# Patient Record
Sex: FEMALE | Race: WHITE | NOT HISPANIC OR LATINO | Employment: PART TIME | URBAN - METROPOLITAN AREA
[De-identification: names, ages, dates, MRNs, and addresses within clinical notes are randomized per-mention and may not be internally consistent; named-entity substitution may affect disease eponyms.]

---

## 2017-11-06 ENCOUNTER — HOSPITAL ENCOUNTER (EMERGENCY)
Facility: HOSPITAL | Age: 27
Discharge: HOME | End: 2017-11-06
Attending: EMERGENCY MEDICINE | Admitting: EMERGENCY MEDICINE
Payer: COMMERCIAL

## 2017-11-06 ENCOUNTER — APPOINTMENT (EMERGENCY)
Dept: ULTRASOUND IMAGING | Facility: HOSPITAL | Age: 27
End: 2017-11-06
Payer: COMMERCIAL

## 2017-11-06 VITALS
OXYGEN SATURATION: 98 % | DIASTOLIC BLOOD PRESSURE: 55 MMHG | SYSTOLIC BLOOD PRESSURE: 101 MMHG | RESPIRATION RATE: 18 BRPM | WEIGHT: 170 LBS | BODY MASS INDEX: 31.28 KG/M2 | HEIGHT: 62 IN | HEART RATE: 76 BPM | TEMPERATURE: 98.7 F

## 2017-11-06 DIAGNOSIS — R11.0 NAUSEA: ICD-10-CM

## 2017-11-06 DIAGNOSIS — R10.9 ABDOMINAL PAIN: Primary | ICD-10-CM

## 2017-11-06 LAB
ALBUMIN SERPL BCP-MCNC: 4 G/DL (ref 3.5–5)
ALP SERPL-CCNC: 59 U/L (ref 46–116)
ALT SERPL W P-5'-P-CCNC: 16 U/L (ref 12–78)
ANION GAP SERPL CALCULATED.3IONS-SCNC: 8 MMOL/L (ref 4–13)
AST SERPL W P-5'-P-CCNC: 11 U/L (ref 5–45)
BASOPHILS # BLD AUTO: 0.05 THOUSANDS/ΜL (ref 0–0.1)
BASOPHILS NFR BLD AUTO: 1 % (ref 0–1)
BILIRUB SERPL-MCNC: 0.3 MG/DL (ref 0.2–1)
BILIRUB UR QL STRIP: NEGATIVE
BUN SERPL-MCNC: 11 MG/DL (ref 5–25)
CALCIUM SERPL-MCNC: 9.3 MG/DL (ref 8.3–10.1)
CHLORIDE SERPL-SCNC: 105 MMOL/L (ref 100–108)
CLARITY UR: NORMAL
CO2 SERPL-SCNC: 29 MMOL/L (ref 21–32)
COLOR UR: YELLOW
CREAT SERPL-MCNC: 0.63 MG/DL (ref 0.6–1.3)
EOSINOPHIL # BLD AUTO: 0.17 THOUSAND/ΜL (ref 0–0.61)
EOSINOPHIL NFR BLD AUTO: 2 % (ref 0–6)
ERYTHROCYTE [DISTWIDTH] IN BLOOD BY AUTOMATED COUNT: 14 % (ref 11.6–15.1)
EXT PREG TEST URINE: NEGATIVE
GFR SERPL CREATININE-BSD FRML MDRD: 123 ML/MIN/1.73SQ M
GLUCOSE SERPL-MCNC: 92 MG/DL (ref 65–140)
GLUCOSE UR STRIP-MCNC: NEGATIVE MG/DL
HCT VFR BLD AUTO: 40.2 % (ref 34.8–46.1)
HGB BLD-MCNC: 13.3 G/DL (ref 11.5–15.4)
HGB UR QL STRIP.AUTO: NEGATIVE
KETONES UR STRIP-MCNC: NEGATIVE MG/DL
LEUKOCYTE ESTERASE UR QL STRIP: NEGATIVE
LIPASE SERPL-CCNC: 160 U/L (ref 73–393)
LYMPHOCYTES # BLD AUTO: 2.34 THOUSANDS/ΜL (ref 0.6–4.47)
LYMPHOCYTES NFR BLD AUTO: 26 % (ref 14–44)
MCH RBC QN AUTO: 31.8 PG (ref 26.8–34.3)
MCHC RBC AUTO-ENTMCNC: 33.1 G/DL (ref 31.4–37.4)
MCV RBC AUTO: 96 FL (ref 82–98)
MONOCYTES # BLD AUTO: 0.71 THOUSAND/ΜL (ref 0.17–1.22)
MONOCYTES NFR BLD AUTO: 8 % (ref 4–12)
NEUTROPHILS # BLD AUTO: 5.77 THOUSANDS/ΜL (ref 1.85–7.62)
NEUTS SEG NFR BLD AUTO: 64 % (ref 43–75)
NITRITE UR QL STRIP: NEGATIVE
NRBC BLD AUTO-RTO: 0 /100 WBCS
PH UR STRIP.AUTO: 7 [PH] (ref 4.5–8)
PLATELET # BLD AUTO: 274 THOUSANDS/UL (ref 149–390)
PMV BLD AUTO: 10.2 FL (ref 8.9–12.7)
POTASSIUM SERPL-SCNC: 4.6 MMOL/L (ref 3.5–5.3)
PROT SERPL-MCNC: 7.5 G/DL (ref 6.4–8.2)
PROT UR STRIP-MCNC: NEGATIVE MG/DL
RBC # BLD AUTO: 4.18 MILLION/UL (ref 3.81–5.12)
SODIUM SERPL-SCNC: 142 MMOL/L (ref 136–145)
SP GR UR STRIP.AUTO: 1.02 (ref 1–1.03)
UROBILINOGEN UR QL STRIP.AUTO: 0.2 E.U./DL
WBC # BLD AUTO: 9.06 THOUSAND/UL (ref 4.31–10.16)

## 2017-11-06 PROCEDURE — 80053 COMPREHEN METABOLIC PANEL: CPT | Performed by: EMERGENCY MEDICINE

## 2017-11-06 PROCEDURE — 76705 ECHO EXAM OF ABDOMEN: CPT

## 2017-11-06 PROCEDURE — 85025 COMPLETE CBC W/AUTO DIFF WBC: CPT | Performed by: EMERGENCY MEDICINE

## 2017-11-06 PROCEDURE — 81025 URINE PREGNANCY TEST: CPT | Performed by: EMERGENCY MEDICINE

## 2017-11-06 PROCEDURE — 81003 URINALYSIS AUTO W/O SCOPE: CPT | Performed by: EMERGENCY MEDICINE

## 2017-11-06 PROCEDURE — 36415 COLL VENOUS BLD VENIPUNCTURE: CPT | Performed by: EMERGENCY MEDICINE

## 2017-11-06 PROCEDURE — 83690 ASSAY OF LIPASE: CPT | Performed by: EMERGENCY MEDICINE

## 2017-11-06 PROCEDURE — 99284 EMERGENCY DEPT VISIT MOD MDM: CPT

## 2017-11-06 PROCEDURE — 96374 THER/PROPH/DIAG INJ IV PUSH: CPT

## 2017-11-06 PROCEDURE — 96375 TX/PRO/DX INJ NEW DRUG ADDON: CPT

## 2017-11-06 PROCEDURE — 96361 HYDRATE IV INFUSION ADD-ON: CPT

## 2017-11-06 RX ORDER — KETOROLAC TROMETHAMINE 30 MG/ML
15 INJECTION, SOLUTION INTRAMUSCULAR; INTRAVENOUS ONCE
Status: COMPLETED | OUTPATIENT
Start: 2017-11-06 | End: 2017-11-06

## 2017-11-06 RX ORDER — ONDANSETRON 2 MG/ML
4 INJECTION INTRAMUSCULAR; INTRAVENOUS ONCE
Status: COMPLETED | OUTPATIENT
Start: 2017-11-06 | End: 2017-11-06

## 2017-11-06 RX ORDER — SUCRALFATE 1 G/1
1 TABLET ORAL 4 TIMES DAILY
Qty: 28 TABLET | Refills: 0 | Status: SHIPPED | OUTPATIENT
Start: 2017-11-06 | End: 2017-12-01

## 2017-11-06 RX ORDER — FAMOTIDINE 20 MG/1
20 TABLET, FILM COATED ORAL 2 TIMES DAILY
Qty: 28 TABLET | Refills: 0 | Status: SHIPPED | OUTPATIENT
Start: 2017-11-06 | End: 2017-12-01

## 2017-11-06 RX ADMIN — HYDROMORPHONE HYDROCHLORIDE 0.5 MG: 1 INJECTION, SOLUTION INTRAMUSCULAR; INTRAVENOUS; SUBCUTANEOUS at 18:34

## 2017-11-06 RX ADMIN — KETOROLAC TROMETHAMINE 15 MG: 30 INJECTION, SOLUTION INTRAMUSCULAR at 18:33

## 2017-11-06 RX ADMIN — ONDANSETRON 4 MG: 2 INJECTION INTRAMUSCULAR; INTRAVENOUS at 18:33

## 2017-11-06 RX ADMIN — SODIUM CHLORIDE 1000 ML: 0.9 INJECTION, SOLUTION INTRAVENOUS at 18:33

## 2017-11-06 NOTE — ED PROVIDER NOTES
History  Chief Complaint   Patient presents with    Abdominal Pain     Per pt - intermittent RUQ pain ongoing for a year  Pt requesting MRI, pt reports seeing PCP last year and diagnosed with "gas pain " Healthy appearing adult, in no distress  44-year-old female denies past medical or surgical history presenting chief complaint of abdominal pain  The patient states that for the last year she has had recurrent right upper quadrant abdominal pain care  She states that it comes and goes at random sometimes it is after eating food sometimes wakes her up from sleep she did get infrequently once every other month or so however the last several months she has gotten every other week  She states that she was woken from sleep at 3 o'clock this afternoon as she works overnight with severe right-sided abdominal pain is localized to her right upper quadrant radiates into her back and across her anterior abdomen although her maximal discomfort is localized again in her right upper quadrant is otherwise nonradiating there are no exacerbating or remitting factors but is associated with nausea 1 episode of nonbloody nonbilious vomiting today  This is similar to similar to every other episode she has had a typically last a couple hours and resolved the reason she is here today is that she states that is increasing in frequency affecting her life and she needs to be evaluated for this  She otherwise denies fevers or chills headache chest pain cough or shortness of breath she denies flank pain back pain urinary symptoms vaginal bleeding or discharge her last normal period was approximately 2 weeks ago she denies leg swelling calf pain or tenderness or rashes  Complete review systems otherwise negative as noted            None       History reviewed  No pertinent past medical history  History reviewed  No pertinent surgical history  History reviewed  No pertinent family history    I have reviewed and agree with the history as documented  Social History   Substance Use Topics    Smoking status: Current Every Day Smoker     Packs/day: 1 00    Smokeless tobacco: Never Used    Alcohol use Yes      Comment: social        Review of Systems   Constitutional: Negative for chills and fever  HENT: Negative for rhinorrhea and sore throat  Respiratory: Negative for cough and shortness of breath  Cardiovascular: Negative for chest pain and palpitations  Gastrointestinal: Positive for abdominal pain, nausea and vomiting  Negative for abdominal distention, blood in stool and diarrhea  Genitourinary: Negative for dysuria, frequency, menstrual problem, pelvic pain, urgency, vaginal bleeding and vaginal discharge  Musculoskeletal: Negative for arthralgias, back pain and myalgias  Skin: Negative for color change and rash  Neurological: Negative for dizziness, weakness and light-headedness  Hematological: Negative for adenopathy  Does not bruise/bleed easily  Psychiatric/Behavioral: Negative for agitation and behavioral problems  All other systems reviewed and are negative  Physical Exam  ED Triage Vitals   Temperature Pulse Respirations Blood Pressure SpO2   11/06/17 1735 11/06/17 1735 11/06/17 1735 11/06/17 1735 11/06/17 1735   98 7 °F (37 1 °C) 91 19 129/69 100 %      Temp Source Heart Rate Source Patient Position - Orthostatic VS BP Location FiO2 (%)   11/06/17 1735 11/06/17 1735 11/06/17 1735 11/06/17 1735 --   Oral Monitor Sitting Left arm       Pain Score       11/06/17 1944       No Pain           Orthostatic Vital Signs  Vitals:    11/06/17 1735 11/06/17 1944   BP: 129/69 101/55   Pulse: 91 76   Patient Position - Orthostatic VS: Sitting Sitting       Physical Exam   Constitutional: She is oriented to person, place, and time  She appears well-developed and well-nourished  No distress  Well-appearing conversational no acute distress   HENT:   Head: Normocephalic and atraumatic     Eyes: EOM are normal  Pupils are equal, round, and reactive to light  Neck: Normal range of motion  Neck supple  No tracheal deviation present  Cardiovascular: Normal rate, regular rhythm and normal heart sounds  Exam reveals no gallop and no friction rub  No murmur heard  Pulmonary/Chest: Effort normal and breath sounds normal  She has no wheezes  She has no rales  Abdominal: Soft  Bowel sounds are normal  She exhibits no distension  There is tenderness  There is no rebound and no guarding  Patient is discretely tender in her right upper quadrant no rebound or guarding no flank or CVA tenderness   Musculoskeletal: Normal range of motion  She exhibits no edema or tenderness  Neurological: She is alert and oriented to person, place, and time  No cranial nerve deficit  She exhibits normal muscle tone  Coordination normal    Skin: Skin is warm and dry  No rash noted  Psychiatric: She has a normal mood and affect  Her behavior is normal    Nursing note and vitals reviewed        ED Medications  Medications   sodium chloride 0 9 % bolus 1,000 mL (0 mL Intravenous Stopped 11/6/17 2026)   ondansetron (ZOFRAN) injection 4 mg (4 mg Intravenous Given 11/6/17 1833)   ketorolac (TORADOL) 30 mg/mL injection 15 mg (15 mg Intravenous Given 11/6/17 1833)   HYDROmorphone (DILAUDID) 1 mg/mL injection 0 5 mg (0 5 mg Intravenous Given 11/6/17 1834)       Diagnostic Studies  Results Reviewed     Procedure Component Value Units Date/Time    POCT pregnancy, urine [[id number]]  (Normal) Resulted:  11/06/17 2003    Lab Status:  Final result Updated:  11/06/17 2003     EXT PREG TEST UR (Ref: Negative) negative    Comprehensive metabolic panel [[id number]] Collected:  11/06/17 1827    Lab Status:  Final result Specimen:  Blood from Arm, Right Updated:  11/06/17 1905     Sodium 142 mmol/L      Potassium 4 6 mmol/L      Chloride 105 mmol/L      CO2 29 mmol/L      Anion Gap 8 mmol/L      BUN 11 mg/dL      Creatinine 0 63 mg/dL      Glucose 92 mg/dL Calcium 9 3 mg/dL      AST 11 U/L      ALT 16 U/L      Alkaline Phosphatase 59 U/L      Total Protein 7 5 g/dL      Albumin 4 0 g/dL      Total Bilirubin 0 30 mg/dL      eGFR 123 ml/min/1 73sq m     Narrative:         National Kidney Disease Education Program recommendations are as follows:  GFR calculation is accurate only with a steady state creatinine  Chronic Kidney disease less than 60 ml/min/1 73 sq  meters  Kidney failure less than 15 ml/min/1 73 sq  meters      Lipase [[id number]]  (Normal) Collected:  11/06/17 1827    Lab Status:  Final result Specimen:  Blood from Arm, Right Updated:  11/06/17 1905     Lipase 160 u/L     UA w Reflex to Microscopic w Reflex to Culture [[id number]]  (Normal) Collected:  11/06/17 1827    Lab Status:  Final result Specimen:  Urine from Urine, Clean Catch Updated:  11/06/17 1847     Color, UA Yellow     Clarity, UA Slightly Cloudy     Specific [place], UA 1 020     pH, UA 7 0     Leukocytes, UA Negative     Nitrite, UA Negative     Protein, UA Negative mg/dl      Glucose, UA Negative mg/dl      Ketones, UA Negative mg/dl      Urobilinogen, UA 0 2 E U /dl      Bilirubin, UA Negative     Blood, UA Negative    CBC and differential [[id number]]  (Normal) Collected:  11/06/17 1827    Lab Status:  Final result Specimen:  Blood from Arm, Right Updated:  11/06/17 1847     WBC 9 06 Thousand/uL      RBC 4 18 Million/uL      Hemoglobin 13 3 g/dL      Hematocrit 40 2 %      MCV 96 fL      MCH 31 8 pg      MCHC 33 1 g/dL      RDW 14 0 %      MPV 10 2 fL      Platelets 553 Thousands/uL      nRBC 0 /100 WBCs      Neutrophils Relative 64 %      Lymphocytes Relative 26 %      Monocytes Relative 8 %      Eosinophils Relative 2 %      Basophils Relative 1 %      Neutrophils Absolute 5 77 Thousands/µL      Lymphocytes Absolute 2 34 Thousands/µL      Monocytes Absolute 0 71 Thousand/µL      Eosinophils Absolute 0 17 Thousand/µL      Basophils Absolute 0 05 Thousands/µL                  US gallbladder Final Result by John Bowling MD (11/06 1935)      Unremarkable exam          Workstation performed: [id number]                    Procedures  Procedures       Phone Contacts  ED Phone Contact    ED Course  ED Course as of Nov 07 1614 Mon Nov 06, 2017 1926 Glucose: [address] [facility] Patient seen and re-evaluated she has no abdominal tenderness her symptoms resolved is most consistent with biliary colic possible acalculous she is discretely tender in her right upper quadrant on exam in the area of her gallbladder however since resolved she has no cholelithiasis she is asymptomatic now she has normal vital signs normal laboratory evaluation her abdomen is soft nontender she has no symptoms awaiting urine pregnancy test before disposition, patient will return if she has return of symptoms there severe or other concerning symptoms otherwise will follow up with GI for further evaluation patient agreeable to plan    2016 EXT PREG TEST UR (Ref: Negative): negative                               MDM  Number of Diagnoses or Management Options  Abdominal pain:   Nausea:   Diagnosis management comments: 59-year-old female without past medical or surgical history with recurrent episodes of right upper quadrant pain with nausea and vomiting last several hours and resolve comma similar episode today that woke her up from sleep approximately 2 and 0 5 hours ago with ongoing symptoms though improving at this time, here for evaluation for increasing frequency she is afebrile normal vital signs patient appears comfortable no acute distress she is tender in her right upper quadrant without rebound or guarding, this is most consistent with biliary colic, less consistent pancreatitis, not consistent with ovarian torsion, pulmonary embolism bowel obstruction, appendicitis etc, will start with symptom control, abdominal labs, urinalysis and urine pregnancy right upper quadrant ultrasound and reassess, disposition pending    [place] Time    Disposition  Final diagnoses:   Abdominal pain   Nausea     Time reflects when diagnosis was documented in both MDM as applicable and the Disposition within this note     Time User Action Codes Description Comment    11/6/2017  8:17 PM Johanne Soto Add [R10 9] Abdominal pain     11/6/2017  8:17 PM [email or web address] Philipp Add [R11 0] Nausea       ED Disposition     ED Disposition Condition Comment    Discharge  [facility] (DP/SNF) discharge to home/self care  Condition at discharge: Good        Follow-up Information     Follow up With Specialties Details Why Contact Info Additional Information    [facility] Emergency Department Emergency Medicine  If symptoms worsen [address] ED, [address], [facility], [address]    Sourav Swan MD Gastroenterology In 1 week  [address] [address]  [phone]           Discharge Medication List as of 11/6/2017  8:22 PM      START taking these medications    Details   famotidine (PEPCID) 20 mg tablet Take 1 tablet by mouth 2 (two) times a day for 14 days, Starting Mon 11/6/2017, Until Mon 11/20/2017, Print      sucralfate (CARAFATE) 1 g tablet Take 1 tablet by mouth 4 (four) times a day for 7 days, Starting Mon 11/6/2017, Until Mon 11/13/2017, Print           No discharge procedures on file      ED Provider  Electronically Signed by           Fady Romero DO  11/07/17 3049

## 2017-11-07 NOTE — DISCHARGE INSTRUCTIONS
Please return if you have worsening or other concerning symptoms you symptoms return in their persistent otherwise follow up with the GI doctor for re-evaluation as instructed    Abdominal Pain   WHAT YOU NEED TO KNOW:   Abdominal pain can be dull, achy, or sharp  You may have pain in one area of your abdomen, or in your entire abdomen  Your pain may be caused by a condition such as constipation, food sensitivity or poisoning, infection, or a blockage  Abdominal pain can also be from a hernia, appendicitis, or an ulcer  Liver, gallbladder, or kidney conditions can also cause abdominal pain  The cause of your abdominal pain may be unknown  DISCHARGE INSTRUCTIONS:   Return to the emergency department if:   · You have new chest pain or shortness of breath  · You have pulsing pain in your upper abdomen or lower back that suddenly becomes constant  · Your pain is in the right lower abdominal area and worsens with movement  · You have a fever over 100 4°F (38°C) or shaking chills  · You are vomiting and cannot keep food or liquids down  · Your pain does not improve or gets worse over the next 8 to 12 hours  · You see blood in your vomit or bowel movements, or they look black and tarry  · Your skin or the whites of your eyes turn yellow  · You are a woman and have a large amount of vaginal bleeding that is not your monthly period  Contact your healthcare provider if:   · You have pain in your lower back  · You are a man and have pain in your testicles  · You have pain when you urinate  · You have questions or concerns about your condition or care  Follow up with your healthcare provider within 24 hours or as directed:  Write down your questions so you remember to ask them during your visits  Medicines:   · Medicines  may be given to calm your stomach and prevent vomiting or to decrease pain  Ask how to take pain medicine safely  · Take your medicine as directed    Contact your healthcare provider if you think your medicine is not helping or if you have side effects  Tell him of her if you are allergic to any medicine  Keep a list of the medicines, vitamins, and herbs you take  Include the amounts, and when and why you take them  Bring the list or the pill bottles to follow-up visits  Carry your medicine list with you in case of an emergency  © 2017 [address] [employer] Information is for End User's use only and may not be sold, redistributed or otherwise used for commercial purposes  All illustrations and images included in CareNotes® are the copyrighted property of [employer] , [employer]  or [employer]  The above information is an  only  It is not intended as medical advice for individual conditions or treatments  Talk to your doctor, nurse or pharmacist before following any medical regimen to see if it is safe and effective for you

## 2017-11-28 ENCOUNTER — HOSPITAL ENCOUNTER (EMERGENCY)
Facility: HOSPITAL | Age: 27
Discharge: HOME | End: 2017-11-29
Admitting: EMERGENCY MEDICINE
Payer: COMMERCIAL

## 2017-11-28 VITALS
HEIGHT: 62 IN | WEIGHT: 180 LBS | HEART RATE: 84 BPM | TEMPERATURE: 99.1 F | BODY MASS INDEX: 33.13 KG/M2 | OXYGEN SATURATION: 99 % | DIASTOLIC BLOOD PRESSURE: 62 MMHG | RESPIRATION RATE: 16 BRPM | SYSTOLIC BLOOD PRESSURE: 142 MMHG

## 2017-11-28 DIAGNOSIS — K08.89 DENTALGIA: Primary | ICD-10-CM

## 2017-11-28 RX ORDER — BUPIVACAINE HYDROCHLORIDE AND EPINEPHRINE 5; 5 MG/ML; UG/ML
1.8 INJECTION, SOLUTION EPIDURAL; INTRACAUDAL; PERINEURAL ONCE
Status: COMPLETED | OUTPATIENT
Start: 2017-11-28 | End: 2017-11-28

## 2017-11-28 RX ORDER — PENICILLIN V POTASSIUM 250 MG/1
500 TABLET ORAL ONCE
Status: COMPLETED | OUTPATIENT
Start: 2017-11-28 | End: 2017-11-28

## 2017-11-28 RX ORDER — PENICILLIN V POTASSIUM 500 MG/1
500 TABLET ORAL 4 TIMES DAILY
Qty: 28 TABLET | Refills: 0 | Status: SHIPPED | OUTPATIENT
Start: 2017-11-28 | End: 2017-12-01

## 2017-11-28 RX ADMIN — PENICILLIN V POTASSIUM 500 MG: 250 TABLET, FILM COATED ORAL at 23:28

## 2017-11-28 RX ADMIN — BUPIVACAINE HYDROCHLORIDE AND EPINEPHRINE BITARTRATE 1.8 ML: 5; .005 INJECTION, SOLUTION SUBCUTANEOUS at 23:29

## 2017-11-29 PROCEDURE — 99282 EMERGENCY DEPT VISIT SF MDM: CPT

## 2017-11-29 NOTE — ED PROVIDER NOTES
History  Chief Complaint   Patient presents with    Dental Pain     c/o rt sided dental pain     59-year-old female patient presents to the ER for evaluation of dental pain  Affected tooth is the right lower 2nd molar  She has had pain for a few days now  No precipitating factors that she can recall  No trauma  States it began hurting spontaneously  Her coworkers told her it could be an infection or an abscess and told her to come to the ER  She has had no fevers or chills no nausea vomiting  She still able to eat and drink and shoe on the unaffected side  She has had no facial pain or swelling  No headache lightheadedness or dizziness  No chest pain  No dysphagia  No odynophagia  No prior history of similar symptoms  She does not have a dentist that she follows with  She tried over-the-counter medications with minimal relief  She has had no recent dental work  She is otherwise healthy          History provided by:  Patient   used: No    Dental Pain   Location:  Lower  Lower teeth location:  31/RL 2nd molar  Quality:  Aching  Severity:  Mild  Onset quality:  Gradual  Duration:  3 days  Timing:  Constant  Progression:  Waxing and waning  Chronicity:  New  Context: not abscess, cap still on, not crown fracture, not dental caries, not dental fracture, not enamel fracture, filling intact, not intrusion, not malocclusion, normal dentition, not recent dental surgery and not trauma    Relieved by:  Nothing  Worsened by:  Cold food/drink, hot food/drink, touching and pressure  Ineffective treatments:  NSAIDs and acetaminophen  Associated symptoms: no congestion, no difficulty swallowing, no drooling, no facial pain, no facial swelling, no fever, no gum swelling, no headaches, no neck pain, no neck swelling, no oral bleeding, no oral lesions and no trismus    Risk factors: smoking    Risk factors: no alcohol problem, no cancer, no chewing tobacco use, no diabetes, no immunosuppression, sufficient dental care and no periodontal disease        Prior to Admission Medications   Prescriptions Last Dose Informant Patient Reported? Taking?   famotidine (PEPCID) 20 mg tablet   No No   Sig: Take 1 tablet by mouth 2 (two) times a day for 14 days   sucralfate (CARAFATE) 1 g tablet   No No   Sig: Take 1 tablet by mouth 4 (four) times a day for 7 days      Facility-Administered Medications: None       History reviewed  No pertinent past medical history  History reviewed  No pertinent surgical history  History reviewed  No pertinent family history  I have reviewed and agree with the history as documented  Social History   Substance Use Topics    Smoking status: Current Every Day Smoker     Packs/day: 1 00    Smokeless tobacco: Never Used    Alcohol use Yes      Comment: social        Review of Systems   Constitutional: Negative for activity change, appetite change, chills, diaphoresis, fatigue, fever and unexpected weight change  HENT: Positive for dental problem  Negative for congestion, drooling, facial swelling, mouth sores, rhinorrhea, sinus pressure, sore throat and trouble swallowing  Eyes: Negative for photophobia, pain, redness and visual disturbance  Respiratory: Negative for apnea, cough, choking, chest tightness, shortness of breath, wheezing and stridor  Cardiovascular: Negative for chest pain, palpitations and leg swelling  Gastrointestinal: Negative for abdominal distention, abdominal pain, blood in stool, constipation, diarrhea, nausea and vomiting  Genitourinary: Negative for decreased urine volume, difficulty urinating, dysuria, enuresis, flank pain, frequency, hematuria and urgency  Musculoskeletal: Negative for arthralgias, myalgias, neck pain and neck stiffness  Skin: Negative for color change, pallor, rash and wound  Allergic/Immunologic: Negative  Neurological: Negative for dizziness, tremors, syncope, weakness, light-headedness, numbness and headaches  Hematological: Negative  Psychiatric/Behavioral: Negative  All other systems reviewed and are negative  Physical Exam  ED Triage Vitals [11/28/17 2305]   Temperature Pulse Respirations Blood Pressure SpO2   99 1 °F (37 3 °C) 84 16 142/62 99 %      Temp Source Heart Rate Source Patient Position - Orthostatic VS BP Location FiO2 (%)   Oral Monitor Sitting Right arm --      Pain Score       8           Orthostatic Vital Signs  Vitals:    11/28/17 2305   BP: 142/62   Pulse: 84   Patient Position - Orthostatic VS: Sitting       Physical Exam   Constitutional: She is oriented to person, place, and time  She appears well-developed and well-nourished  Non-toxic appearance  She does not have a sickly appearance  She does not appear ill  No distress  HENT:   Head: Normocephalic and atraumatic  Mouth/Throat: Uvula is midline, oropharynx is clear and moist and mucous membranes are normal    No obvious dental abscess  There is no trismus  There is no peritonsillar abscess  She does have tenderness around the 2nd right lower molar  Eyes: EOM and lids are normal  Pupils are equal, round, and reactive to light  Neck: Normal range of motion  Neck supple  Cardiovascular: Normal rate, regular rhythm, S1 normal, S2 normal, normal heart sounds, intact distal pulses and normal pulses  Exam reveals no gallop, no distant heart sounds, no friction rub and no decreased pulses  No murmur heard  Pulses:       Radial pulses are 2+ on the right side, and 2+ on the left side  Pulmonary/Chest: Effort normal and breath sounds normal  No accessory muscle usage  No apnea, no tachypnea and no bradypnea  No respiratory distress  She has no decreased breath sounds  She has no wheezes  She has no rhonchi  She has no rales  Abdominal: Soft  Normal appearance and bowel sounds are normal  She exhibits no distension and no mass  There is no tenderness  There is no rigidity, no rebound and no guarding  No hernia  Musculoskeletal: Normal range of motion  She exhibits no edema, tenderness or deformity  Neurological: She is alert and oriented to person, place, and time  No cranial nerve deficit  GCS eye subscore is 4  GCS verbal subscore is 5  GCS motor subscore is 6  GCS 15  AAOx3  Ambulating in department without difficulty  CN II-XII grossly intact  No focal neuro deficits  Skin: Skin is warm, dry and intact  No rash noted  She is not diaphoretic  No erythema  No pallor  Psychiatric: Her speech is normal    Nursing note and vitals reviewed  ED Medications  Medications   bupivacaine-epinephrine (PF) (MARCAINE/EPINEPHRINE PF) 0 5 %-1:[id number] injection 1 8 mL (1 8 mL Injection Given by Other 11/28/17 2730)   penicillin V potassium (VEETID) tablet 500 mg (500 mg Oral Given 11/28/17 7502)       Diagnostic Studies  Results Reviewed     None                 No orders to display              Procedures  Nerve Block  Date/Time: 11/29/2017 12:44 AM  Performed by: Heather Campbell by: Brielle Whitehead     Patient location:  ED  Other Assisting Provider: Yes (comment)    Consent:     Consent obtained:  Verbal    Consent given by:  Patient    Risks discussed:  Infection, intravenous injection, unsuccessful block, pain, nerve damage, bleeding, allergic reaction and swelling    Alternatives discussed:  No treatment  Universal protocol:     Procedure explained and questions answered to patient or proxy's satisfaction: yes      Patient identity confirmed:  Arm band, verbally with patient and hospital-assigned identification number  Indications:     Indications:  Pain relief  Location:     Nerve block body site: dental block  Laterality:  Right  Procedure details (see MAR for exact dosages):      Block needle gauge:  25 G    Anesthetic injected:  Bupivacaine 0 5% w/o epi    Steroid injected:  None    Additive injected:  None    Injection procedure:  Anatomic landmarks identified, anatomic landmarks palpated, incremental injection, introduced needle and negative aspiration for blood  Post-procedure details:     Dressing:  None    Outcome:  Pain improved    Patient tolerance of procedure: Tolerated well, no immediate complications             Phone Contacts  ED Phone Contact    ED Course  ED Course                                MDM  Number of Diagnoses or Management Options  Hope Rumpf: new and requires workup  Diagnosis management comments: DDx including but not limited to: dental petar, dental infection, dental abscess, dry socket, gingivitis; doubt flora's angina  Plan:  Offered dental block versus Toradol  Patient would prefer having dental block performed  For concern of early infection or dental abscess will start her on penicillin  Risk of Complications, Morbidity, and/or Mortality  Presenting problems: low  Management options: low  General comments: 41-year-old female with toothache  She was given a dental block her pain has greatly improved  There is no immediate complication of the procedure  We will start her on Pen-VK for possible early infection or abscess  Patient was given contact information for local dentistry  Given return parameters to come back to the ER  Recommend she begin taking Tylenol and Motrin to gather as it is more effective  Up to every 6 hours  Return parameters were provided  The patient understands and agrees with the plan  Patient Progress  Patient progress: stable    CritCare Time    Disposition  Final diagnoses:   Hope Rumpf     Time reflects when diagnosis was documented in both MDM as applicable and the Disposition within this note     Time User Action Codes Description Comment    11/28/2017 11:58 PM Melonie Moeller Add [K08 [id number]] Hope Rumpf       ED Disposition     ED Disposition Condition Comment    Discharge  [facility] (DP[facility]) discharge to home/self care      Condition at discharge: Good        Follow-up Information     Follow up With Specialties Details Why Contact [address]   if unable to see local dentist [address] [address] [phone]    [facility]  Call in 1 day for dentist Address: [address] [place] [address], [address] [place] [address]  Phone: [phone]          Discharge Medication List as of 11/29/2017 12:00 AM      START taking these medications    Details   penicillin V potassium (VEETID) 500 mg tablet Take 1 tablet by mouth 4 (four) times a day for 7 days, Starting Tue 11/28/2017, Until Tue 12/5/2017, Print         CONTINUE these medications which have NOT CHANGED    Details   famotidine (PEPCID) 20 mg tablet Take 1 tablet by mouth 2 (two) times a day for 14 days, Starting Mon 11/6/2017, Until Mon 11/20/2017, Print      sucralfate (CARAFATE) 1 g tablet Take 1 tablet by mouth 4 (four) times a day for 7 days, Starting Mon 11/6/2017, Until Mon 11/13/2017, Print           No discharge procedures on file      ED Provider  Electronically Signed by           Bi Dial PA-C  11/29/17 0545

## 2017-11-29 NOTE — DISCHARGE INSTRUCTIONS
Return to the Emergency Department sooner if increased pain, fever, vomiting, difficulty breathing or swallowing, drooling  Toothache   WHAT YOU NEED TO KNOW:   A toothache is pain that is caused by irritation of the nerves in the center of your tooth  The irritation may be caused by several problems, such as a cavity, an infection, a cracked tooth, or gum disease  It is very important to follow up with your dentist so the cause of your toothache can be diagnosed and treated  This can help prevent more serious problems  DISCHARGE INSTRUCTIONS:   Medicines: You may  need any of the following:  · NSAIDs  decrease swelling and pain  This medicine can be bought with or without a doctor's order  This medicine can cause stomach bleeding or kidney problems in certain people  If you take blood thinner medicine, always ask your healthcare provider if NSAIDs are safe for you  Always read the medicine label and follow the directions on it before using this medicine  · Acetaminophen  decreases pain  It is available without a doctor's order  Ask how much to take and how often to take it  Follow directions  Acetaminophen can cause liver damage if not taken correctly  · Pain medicine  may be given as a pill or as medicine that you put directly on your tooth or gums  Do not wait until the pain is severe before you take this medicine  · Antibiotics  help fight or prevent an infection caused by bacteria  Take them as directed  · Take your medicine as directed  Contact your healthcare provider if you think your medicine is not helping or if you have side effects  Tell him of her if you are allergic to any medicine  Keep a list of the medicines, vitamins, and herbs you take  Include the amounts, and when and why you take them  Bring the list or the pill bottles to follow-up visits  Carry your medicine list with you in case of an emergency  Follow up with your dentist as directed:   You may be referred to a dental surgeon  Write down your questions so you remember to ask them during your visits  Self-care:   · Rinse your mouth with warm salt water 4 times a day or as directed  · You may need to eat soft foods to help relieve pain caused by chewing  Contact your dentist if:   · You have questions or concerns about your condition or care  Return to the emergency department if:   · You have trouble breathing  · You have swelling in your face or neck  · You have a fever and chills  · You have trouble speaking or swallowing  · You have trouble opening or closing your mouth  © 2017 [facility] Information is for End User's use only and may not be sold, redistributed or otherwise used for commercial purposes  All illustrations and images included in CareNotes® are the copyrighted property of [employer]  or [address]  The above information is an  only  It is not intended as medical advice for individual conditions or treatments  Talk to your doctor, nurse or pharmacist before following any medical regimen to see if it is safe and effective for you

## 2017-12-01 ENCOUNTER — ANESTHESIA EVENT (INPATIENT)
Dept: PERIOP | Facility: HOSPITAL | Age: 27
DRG: 263 | End: 2017-12-01
Payer: COMMERCIAL

## 2017-12-01 ENCOUNTER — ANESTHESIA (INPATIENT)
Dept: PERIOP | Facility: HOSPITAL | Age: 27
DRG: 263 | End: 2017-12-01
Payer: COMMERCIAL

## 2017-12-01 ENCOUNTER — HOSPITAL ENCOUNTER (INPATIENT)
Facility: HOSPITAL | Age: 27
LOS: 2 days | Discharge: HOME | DRG: 263 | End: 2017-12-03
Attending: EMERGENCY MEDICINE | Admitting: SURGERY
Payer: COMMERCIAL

## 2017-12-01 ENCOUNTER — APPOINTMENT (EMERGENCY)
Dept: CT IMAGING | Facility: HOSPITAL | Age: 27
DRG: 263 | End: 2017-12-01
Payer: COMMERCIAL

## 2017-12-01 DIAGNOSIS — K81.9 CHOLECYSTITIS: Primary | ICD-10-CM

## 2017-12-01 LAB
ALBUMIN SERPL BCP-MCNC: 4.7 G/DL (ref 3.5–5)
ALP SERPL-CCNC: 58 U/L (ref 46–116)
ALT SERPL W P-5'-P-CCNC: 12 U/L (ref 12–78)
ANION GAP SERPL CALCULATED.3IONS-SCNC: 11 MMOL/L (ref 4–13)
AST SERPL W P-5'-P-CCNC: 14 U/L (ref 5–45)
BACTERIA UR QL AUTO: ABNORMAL /HPF
BASOPHILS # BLD AUTO: 0.06 THOUSANDS/ΜL (ref 0–0.1)
BASOPHILS NFR BLD AUTO: 1 % (ref 0–1)
BILIRUB DIRECT SERPL-MCNC: 0.13 MG/DL (ref 0–0.2)
BILIRUB SERPL-MCNC: 0.4 MG/DL (ref 0.2–1)
BILIRUB UR QL STRIP: NEGATIVE
BUN SERPL-MCNC: 10 MG/DL (ref 5–25)
CALCIUM SERPL-MCNC: 10 MG/DL (ref 8.3–10.1)
CHLORIDE SERPL-SCNC: 107 MMOL/L (ref 100–108)
CLARITY UR: ABNORMAL
CO2 SERPL-SCNC: 23 MMOL/L (ref 21–32)
COLOR UR: YELLOW
CREAT SERPL-MCNC: 0.7 MG/DL (ref 0.6–1.3)
EOSINOPHIL # BLD AUTO: 0.02 THOUSAND/ΜL (ref 0–0.61)
EOSINOPHIL NFR BLD AUTO: 0 % (ref 0–6)
ERYTHROCYTE [DISTWIDTH] IN BLOOD BY AUTOMATED COUNT: 13 % (ref 11.6–15.1)
GFR SERPL CREATININE-BSD FRML MDRD: 119 ML/MIN/1.73SQ M
GLUCOSE SERPL-MCNC: 110 MG/DL (ref 65–140)
GLUCOSE UR STRIP-MCNC: NEGATIVE MG/DL
HCG SERPL QL: NEGATIVE
HCT VFR BLD AUTO: 42.9 % (ref 34.8–46.1)
HGB BLD-MCNC: 14.6 G/DL (ref 11.5–15.4)
HGB UR QL STRIP.AUTO: NEGATIVE
KETONES UR STRIP-MCNC: ABNORMAL MG/DL
LEUKOCYTE ESTERASE UR QL STRIP: ABNORMAL
LIPASE SERPL-CCNC: 133 U/L (ref 73–393)
LYMPHOCYTES # BLD AUTO: 1.65 THOUSANDS/ΜL (ref 0.6–4.47)
LYMPHOCYTES NFR BLD AUTO: 13 % (ref 14–44)
MCH RBC QN AUTO: 32.2 PG (ref 26.8–34.3)
MCHC RBC AUTO-ENTMCNC: 34 G/DL (ref 31.4–37.4)
MCV RBC AUTO: 95 FL (ref 82–98)
MONOCYTES # BLD AUTO: 0.62 THOUSAND/ΜL (ref 0.17–1.22)
MONOCYTES NFR BLD AUTO: 5 % (ref 4–12)
NEUTROPHILS # BLD AUTO: 10.2 THOUSANDS/ΜL (ref 1.85–7.62)
NEUTS SEG NFR BLD AUTO: 81 % (ref 43–75)
NITRITE UR QL STRIP: POSITIVE
NON-SQ EPI CELLS URNS QL MICRO: ABNORMAL /HPF
NRBC BLD AUTO-RTO: 0 /100 WBCS
PH UR STRIP.AUTO: 8.5 [PH] (ref 4.5–8)
PLATELET # BLD AUTO: 276 THOUSANDS/UL (ref 149–390)
PMV BLD AUTO: 10.8 FL (ref 8.9–12.7)
POTASSIUM SERPL-SCNC: 3.6 MMOL/L (ref 3.5–5.3)
PROT SERPL-MCNC: 8.4 G/DL (ref 6.4–8.2)
PROT UR STRIP-MCNC: ABNORMAL MG/DL
RBC # BLD AUTO: 4.53 MILLION/UL (ref 3.81–5.12)
RBC #/AREA URNS AUTO: ABNORMAL /HPF
SODIUM SERPL-SCNC: 141 MMOL/L (ref 136–145)
SP GR UR STRIP.AUTO: 1.02 (ref 1–1.03)
UROBILINOGEN UR QL STRIP.AUTO: 0.2 E.U./DL
WBC # BLD AUTO: 12.59 THOUSAND/UL (ref 4.31–10.16)
WBC #/AREA URNS AUTO: ABNORMAL /HPF

## 2017-12-01 PROCEDURE — 80076 HEPATIC FUNCTION PANEL: CPT | Performed by: EMERGENCY MEDICINE

## 2017-12-01 PROCEDURE — 99285 EMERGENCY DEPT VISIT HI MDM: CPT

## 2017-12-01 PROCEDURE — 74177 CT ABD & PELVIS W/CONTRAST: CPT

## 2017-12-01 PROCEDURE — 81001 URINALYSIS AUTO W/SCOPE: CPT | Performed by: EMERGENCY MEDICINE

## 2017-12-01 PROCEDURE — 96375 TX/PRO/DX INJ NEW DRUG ADDON: CPT

## 2017-12-01 PROCEDURE — 80048 BASIC METABOLIC PNL TOTAL CA: CPT | Performed by: EMERGENCY MEDICINE

## 2017-12-01 PROCEDURE — 0WQF4ZZ REPAIR ABDOMINAL WALL, PERCUTANEOUS ENDOSCOPIC APPROACH: ICD-10-PCS | Performed by: SURGERY

## 2017-12-01 PROCEDURE — 88304 TISSUE EXAM BY PATHOLOGIST: CPT | Performed by: SURGERY

## 2017-12-01 PROCEDURE — 85025 COMPLETE CBC W/AUTO DIFF WBC: CPT | Performed by: EMERGENCY MEDICINE

## 2017-12-01 PROCEDURE — 96361 HYDRATE IV INFUSION ADD-ON: CPT

## 2017-12-01 PROCEDURE — 0FT44ZZ RESECTION OF GALLBLADDER, PERCUTANEOUS ENDOSCOPIC APPROACH: ICD-10-PCS | Performed by: SURGERY

## 2017-12-01 PROCEDURE — 36415 COLL VENOUS BLD VENIPUNCTURE: CPT | Performed by: EMERGENCY MEDICINE

## 2017-12-01 PROCEDURE — 96374 THER/PROPH/DIAG INJ IV PUSH: CPT

## 2017-12-01 PROCEDURE — 83690 ASSAY OF LIPASE: CPT | Performed by: EMERGENCY MEDICINE

## 2017-12-01 PROCEDURE — 84703 CHORIONIC GONADOTROPIN ASSAY: CPT | Performed by: EMERGENCY MEDICINE

## 2017-12-01 RX ORDER — GLYCOPYRROLATE 0.2 MG/ML
INJECTION INTRAMUSCULAR; INTRAVENOUS AS NEEDED
Status: DISCONTINUED | OUTPATIENT
Start: 2017-12-01 | End: 2017-12-01 | Stop reason: SURG

## 2017-12-01 RX ORDER — MORPHINE SULFATE 4 MG/ML
4 INJECTION, SOLUTION INTRAMUSCULAR; INTRAVENOUS ONCE
Status: COMPLETED | OUTPATIENT
Start: 2017-12-01 | End: 2017-12-01

## 2017-12-01 RX ORDER — ROCURONIUM BROMIDE 10 MG/ML
INJECTION, SOLUTION INTRAVENOUS AS NEEDED
Status: DISCONTINUED | OUTPATIENT
Start: 2017-12-01 | End: 2017-12-01 | Stop reason: SURG

## 2017-12-01 RX ORDER — HYDROCODONE BITARTRATE AND ACETAMINOPHEN 5; 325 MG/1; MG/1
2 TABLET ORAL EVERY 4 HOURS PRN
Status: DISCONTINUED | OUTPATIENT
Start: 2017-12-01 | End: 2017-12-02

## 2017-12-01 RX ORDER — PROPOFOL 10 MG/ML
INJECTION, EMULSION INTRAVENOUS AS NEEDED
Status: DISCONTINUED | OUTPATIENT
Start: 2017-12-01 | End: 2017-12-01 | Stop reason: SURG

## 2017-12-01 RX ORDER — SODIUM CHLORIDE, SODIUM LACTATE, POTASSIUM CHLORIDE, CALCIUM CHLORIDE 600; 310; 30; 20 MG/100ML; MG/100ML; MG/100ML; MG/100ML
125 INJECTION, SOLUTION INTRAVENOUS CONTINUOUS
Status: DISCONTINUED | OUTPATIENT
Start: 2017-12-01 | End: 2017-12-02

## 2017-12-01 RX ORDER — KETOROLAC TROMETHAMINE 30 MG/ML
INJECTION, SOLUTION INTRAMUSCULAR; INTRAVENOUS AS NEEDED
Status: DISCONTINUED | OUTPATIENT
Start: 2017-12-01 | End: 2017-12-01 | Stop reason: SURG

## 2017-12-01 RX ORDER — ONDANSETRON 2 MG/ML
4 INJECTION INTRAMUSCULAR; INTRAVENOUS ONCE
Status: COMPLETED | OUTPATIENT
Start: 2017-12-01 | End: 2017-12-01

## 2017-12-01 RX ORDER — ACETAMINOPHEN 325 MG/1
650 TABLET ORAL EVERY 6 HOURS PRN
Status: DISCONTINUED | OUTPATIENT
Start: 2017-12-01 | End: 2017-12-03 | Stop reason: HOSPADM

## 2017-12-01 RX ORDER — MORPHINE SULFATE 2 MG/ML
2 INJECTION, SOLUTION INTRAMUSCULAR; INTRAVENOUS EVERY 4 HOURS PRN
Status: DISCONTINUED | OUTPATIENT
Start: 2017-12-01 | End: 2017-12-02

## 2017-12-01 RX ORDER — ONDANSETRON 2 MG/ML
INJECTION INTRAMUSCULAR; INTRAVENOUS AS NEEDED
Status: DISCONTINUED | OUTPATIENT
Start: 2017-12-01 | End: 2017-12-01 | Stop reason: SURG

## 2017-12-01 RX ORDER — METOCLOPRAMIDE HYDROCHLORIDE 5 MG/ML
10 INJECTION INTRAMUSCULAR; INTRAVENOUS ONCE AS NEEDED
Status: DISCONTINUED | OUTPATIENT
Start: 2017-12-01 | End: 2017-12-01 | Stop reason: HOSPADM

## 2017-12-01 RX ORDER — KETOROLAC TROMETHAMINE 30 MG/ML
15 INJECTION, SOLUTION INTRAMUSCULAR; INTRAVENOUS ONCE
Status: COMPLETED | OUTPATIENT
Start: 2017-12-01 | End: 2017-12-01

## 2017-12-01 RX ORDER — MAGNESIUM HYDROXIDE 1200 MG/15ML
LIQUID ORAL AS NEEDED
Status: DISCONTINUED | OUTPATIENT
Start: 2017-12-01 | End: 2017-12-01 | Stop reason: HOSPADM

## 2017-12-01 RX ORDER — MIDAZOLAM HYDROCHLORIDE 1 MG/ML
INJECTION INTRAMUSCULAR; INTRAVENOUS AS NEEDED
Status: DISCONTINUED | OUTPATIENT
Start: 2017-12-01 | End: 2017-12-01 | Stop reason: SURG

## 2017-12-01 RX ORDER — DEXMEDETOMIDINE HYDROCHLORIDE 100 UG/ML
INJECTION, SOLUTION INTRAVENOUS AS NEEDED
Status: DISCONTINUED | OUTPATIENT
Start: 2017-12-01 | End: 2017-12-01 | Stop reason: SURG

## 2017-12-01 RX ORDER — SODIUM CHLORIDE 9 MG/ML
125 INJECTION, SOLUTION INTRAVENOUS CONTINUOUS
Status: DISCONTINUED | OUTPATIENT
Start: 2017-12-01 | End: 2017-12-01

## 2017-12-01 RX ORDER — MORPHINE SULFATE 2 MG/ML
2 INJECTION, SOLUTION INTRAMUSCULAR; INTRAVENOUS EVERY 4 HOURS PRN
Status: DISCONTINUED | OUTPATIENT
Start: 2017-12-01 | End: 2017-12-01

## 2017-12-01 RX ORDER — BUPIVACAINE HYDROCHLORIDE 5 MG/ML
INJECTION, SOLUTION PERINEURAL AS NEEDED
Status: DISCONTINUED | OUTPATIENT
Start: 2017-12-01 | End: 2017-12-01 | Stop reason: HOSPADM

## 2017-12-01 RX ORDER — ONDANSETRON 2 MG/ML
4 INJECTION INTRAMUSCULAR; INTRAVENOUS EVERY 6 HOURS PRN
Status: DISCONTINUED | OUTPATIENT
Start: 2017-12-01 | End: 2017-12-03 | Stop reason: HOSPADM

## 2017-12-01 RX ORDER — FENTANYL CITRATE/PF 50 MCG/ML
25 SYRINGE (ML) INJECTION
Status: DISCONTINUED | OUTPATIENT
Start: 2017-12-01 | End: 2017-12-01 | Stop reason: HOSPADM

## 2017-12-01 RX ORDER — DOCUSATE SODIUM 100 MG/1
100 CAPSULE, LIQUID FILLED ORAL 2 TIMES DAILY
Status: DISCONTINUED | OUTPATIENT
Start: 2017-12-02 | End: 2017-12-03 | Stop reason: HOSPADM

## 2017-12-01 RX ORDER — ONDANSETRON 2 MG/ML
4 INJECTION INTRAMUSCULAR; INTRAVENOUS ONCE AS NEEDED
Status: DISCONTINUED | OUTPATIENT
Start: 2017-12-01 | End: 2017-12-01 | Stop reason: HOSPADM

## 2017-12-01 RX ORDER — FENTANYL CITRATE 50 UG/ML
INJECTION, SOLUTION INTRAMUSCULAR; INTRAVENOUS AS NEEDED
Status: DISCONTINUED | OUTPATIENT
Start: 2017-12-01 | End: 2017-12-01 | Stop reason: SURG

## 2017-12-01 RX ORDER — LIDOCAINE HYDROCHLORIDE 10 MG/ML
INJECTION, SOLUTION INFILTRATION; PERINEURAL AS NEEDED
Status: DISCONTINUED | OUTPATIENT
Start: 2017-12-01 | End: 2017-12-01 | Stop reason: SURG

## 2017-12-01 RX ORDER — NICOTINE 21 MG/24HR
1 PATCH, TRANSDERMAL 24 HOURS TRANSDERMAL DAILY
Status: DISCONTINUED | OUTPATIENT
Start: 2017-12-01 | End: 2017-12-03 | Stop reason: HOSPADM

## 2017-12-01 RX ORDER — MORPHINE SULFATE 10 MG/ML
6 INJECTION, SOLUTION INTRAMUSCULAR; INTRAVENOUS ONCE
Status: COMPLETED | OUTPATIENT
Start: 2017-12-01 | End: 2017-12-01

## 2017-12-01 RX ADMIN — MIDAZOLAM 2 MG: 1 INJECTION INTRAMUSCULAR; INTRAVENOUS at 12:16

## 2017-12-01 RX ADMIN — IOHEXOL 100 ML: 350 INJECTION, SOLUTION INTRAVENOUS at 06:54

## 2017-12-01 RX ADMIN — FENTANYL CITRATE 25 MCG: 50 INJECTION INTRAMUSCULAR; INTRAVENOUS at 14:31

## 2017-12-01 RX ADMIN — KETOROLAC TROMETHAMINE 15 MG: 30 INJECTION, SOLUTION INTRAMUSCULAR at 07:14

## 2017-12-01 RX ADMIN — ROCURONIUM BROMIDE 30 MG: 10 INJECTION INTRAVENOUS at 12:20

## 2017-12-01 RX ADMIN — MORPHINE SULFATE 6 MG: 10 INJECTION, SOLUTION INTRAMUSCULAR; INTRAVENOUS at 06:07

## 2017-12-01 RX ADMIN — SODIUM CHLORIDE, SODIUM LACTATE, POTASSIUM CHLORIDE, AND CALCIUM CHLORIDE 125 ML/HR: .6; .31; .03; .02 INJECTION, SOLUTION INTRAVENOUS at 10:48

## 2017-12-01 RX ADMIN — SODIUM CHLORIDE, SODIUM LACTATE, POTASSIUM CHLORIDE, AND CALCIUM CHLORIDE 125 ML/HR: .6; .31; .03; .02 INJECTION, SOLUTION INTRAVENOUS at 18:36

## 2017-12-01 RX ADMIN — LIDOCAINE HYDROCHLORIDE 50 MG: 10 INJECTION, SOLUTION INFILTRATION; PERINEURAL at 12:20

## 2017-12-01 RX ADMIN — CEFAZOLIN SODIUM 2000 MG: 2 SOLUTION INTRAVENOUS at 12:25

## 2017-12-01 RX ADMIN — GLYCOPYRROLATE 0.4 MG: 0.2 INJECTION, SOLUTION INTRAMUSCULAR; INTRAVENOUS at 13:39

## 2017-12-01 RX ADMIN — ONDANSETRON 4 MG: 2 INJECTION INTRAMUSCULAR; INTRAVENOUS at 06:07

## 2017-12-01 RX ADMIN — SODIUM CHLORIDE, SODIUM LACTATE, POTASSIUM CHLORIDE, AND CALCIUM CHLORIDE: .6; .31; .03; .02 INJECTION, SOLUTION INTRAVENOUS at 13:40

## 2017-12-01 RX ADMIN — MORPHINE SULFATE 4 MG: 4 INJECTION, SOLUTION INTRAMUSCULAR; INTRAVENOUS at 07:57

## 2017-12-01 RX ADMIN — FENTANYL CITRATE 25 MCG: 50 INJECTION INTRAMUSCULAR; INTRAVENOUS at 14:19

## 2017-12-01 RX ADMIN — SODIUM CHLORIDE 1000 ML: 0.9 INJECTION, SOLUTION INTRAVENOUS at 06:16

## 2017-12-01 RX ADMIN — HYDROCODONE BITARTRATE AND ACETAMINOPHEN 2 TABLET: 5; 325 TABLET ORAL at 16:14

## 2017-12-01 RX ADMIN — FENTANYL CITRATE 25 MCG: 50 INJECTION INTRAMUSCULAR; INTRAVENOUS at 14:14

## 2017-12-01 RX ADMIN — KETOROLAC TROMETHAMINE 30 MG: 30 INJECTION, SOLUTION INTRAMUSCULAR at 13:14

## 2017-12-01 RX ADMIN — HYDROCODONE BITARTRATE AND ACETAMINOPHEN 2 TABLET: 5; 325 TABLET ORAL at 20:26

## 2017-12-01 RX ADMIN — ONDANSETRON 4 MG: 2 INJECTION INTRAMUSCULAR; INTRAVENOUS at 13:14

## 2017-12-01 RX ADMIN — MORPHINE SULFATE 2 MG: 2 INJECTION, SOLUTION INTRAMUSCULAR; INTRAVENOUS at 17:04

## 2017-12-01 RX ADMIN — SODIUM CHLORIDE, SODIUM LACTATE, POTASSIUM CHLORIDE, AND CALCIUM CHLORIDE 125 ML/HR: .6; .31; .03; .02 INJECTION, SOLUTION INTRAVENOUS at 14:34

## 2017-12-01 RX ADMIN — DEXAMETHASONE SODIUM PHOSPHATE 10 MG: 10 INJECTION INTRAMUSCULAR; INTRAVENOUS at 12:28

## 2017-12-01 RX ADMIN — PROPOFOL 200 MG: 10 INJECTION, EMULSION INTRAVENOUS at 12:20

## 2017-12-01 RX ADMIN — NEOSTIGMINE METHYLSULFATE 2 MG: 1 INJECTION, SOLUTION INTRAMUSCULAR; INTRAVENOUS; SUBCUTANEOUS at 13:39

## 2017-12-01 RX ADMIN — MORPHINE SULFATE 2 MG: 2 INJECTION, SOLUTION INTRAMUSCULAR; INTRAVENOUS at 21:26

## 2017-12-01 RX ADMIN — DEXMEDETOMIDINE 8 MCG: 100 INJECTION, SOLUTION, CONCENTRATE INTRAVENOUS at 12:40

## 2017-12-01 RX ADMIN — DEXMEDETOMIDINE 8 MCG: 100 INJECTION, SOLUTION, CONCENTRATE INTRAVENOUS at 12:30

## 2017-12-01 RX ADMIN — FENTANYL CITRATE 100 MCG: 50 INJECTION, SOLUTION INTRAMUSCULAR; INTRAVENOUS at 12:19

## 2017-12-01 RX ADMIN — FENTANYL CITRATE 25 MCG: 50 INJECTION INTRAMUSCULAR; INTRAVENOUS at 14:25

## 2017-12-01 NOTE — ED PROVIDER NOTES
History  Chief Complaint   Patient presents with    Rib Pain     Pt c/o rib pain that started lastnight at work  Pt reports pain radiates "around lower back and entire stomach " Pt states "I was here two months ago they told me it was my gallbladder and to come back if the pain comes back "     HPI patient is a 66-year-old female, she reports right upper quadrant abdominal pain that radiates to her back  Patient reports she was here previously with similar abdominal pain had an ultrasound that was negative but told it might be her gallbladder  Patient reports last night she developed the pain  She describes it as severe cramping sensation with radiation to her right flank  She reports nausea but no vomiting  She denies any urinary symptoms  There is no fever or chills  She denies any previous abdominal surgery  She denies pregnancy  Past medical history previously healthy  Family history noncontributory  Social history, smoker no history of drug abuse  None       History reviewed  No pertinent past medical history  Past Surgical History:   Procedure Laterality Date     SECTION         History reviewed  No pertinent family history  I have reviewed and agree with the history as documented  Social History   Substance Use Topics    Smoking status: Current Every Day Smoker     Packs/day: 0 50    Smokeless tobacco: Never Used    Alcohol use Yes      Comment: social        Review of Systems   Constitutional: Negative for diaphoresis, fatigue and fever  HENT: Negative for congestion, ear pain, nosebleeds and sore throat  Eyes: Negative for photophobia, pain, discharge and visual disturbance  Respiratory: Negative for cough, choking, chest tightness, shortness of breath and wheezing  Cardiovascular: Negative for chest pain and palpitations  Gastrointestinal: Positive for abdominal pain and nausea  Negative for abdominal distention, diarrhea and vomiting     Genitourinary: Negative for dysuria, flank pain and frequency  Musculoskeletal: Negative for back pain, gait problem and joint swelling  Skin: Negative for color change and rash  Neurological: Negative for dizziness, syncope and headaches  Psychiatric/Behavioral: Negative for behavioral problems and confusion  The patient is not nervous/anxious  All other systems reviewed and are negative  Physical Exam  ED Triage Vitals [12/01/17 0547]   Temperature Pulse Respirations Blood Pressure SpO2   98 3 °F (36 8 °C) 78 20 133/84 100 %      Temp Source Heart Rate Source Patient Position - Orthostatic VS BP Location FiO2 (%)   Oral Monitor Sitting Right arm --      Pain Score       Worst Possible Pain           Orthostatic Vital Signs  Vitals:    12/01/17 0547 12/01/17 0715   BP: 133/84 136/90   Pulse: 78 80   Patient Position - Orthostatic VS: Sitting Sitting       Physical Exam   Constitutional: She is oriented to person, place, and time  She appears well-developed and well-nourished  HENT:   Head: Normocephalic  Right Ear: External ear normal    Left Ear: External ear normal    Nose: Nose normal    Mouth/Throat: Oropharynx is clear and moist    Eyes: EOM and lids are normal  Pupils are equal, round, and reactive to light  Neck: Normal range of motion  Neck supple  Cardiovascular: Normal rate, regular rhythm, normal heart sounds and intact distal pulses  Pulmonary/Chest: Effort normal and breath sounds normal  No respiratory distress  Abdominal: Soft  Bowel sounds are normal  She exhibits no distension and no mass  There is tenderness  There is no rebound and no guarding  No hernia  There is right upper quadrant tenderness without rebound or guarding   Musculoskeletal: Normal range of motion  She exhibits no deformity  Neurological: She is alert and oriented to person, place, and time  Skin: Skin is warm and dry  Psychiatric: She has a normal mood and affect  Nursing note and vitals reviewed        ED Medications  Medications   morphine (PF) 4 mg/mL injection 4 mg (not administered)   morphine (PF) 10 mg/mL injection 6 mg (6 mg Intravenous Given 12/1/17 0607)   ondansetron (ZOFRAN) injection 4 mg (4 mg Intravenous Given 12/1/17 0607)   sodium chloride 0 9 % bolus 1,000 mL (1,000 mL Intravenous New Bag 12/1/17 0616)   iohexol (OMNIPAQUE) 350 MG/ML injection (MULTI-DOSE) 100 mL (100 mL Intravenous Given 12/1/17 0654)   ketorolac (TORADOL) 30 mg/mL injection 15 mg (15 mg Intravenous Given 12/1/17 0714)       Diagnostic Studies  Results Reviewed     Procedure Component Value Units Date/Time    Hepatic function panel [[id number]]  (Abnormal) Collected:  12/01/17 0606    Lab Status:  Final result Specimen:  Blood from Arm, Right Updated:  12/01/17 0645     Total Bilirubin 0 40 mg/dL      Bilirubin, Direct 0 13 mg/dL      Alkaline Phosphatase 58 U/L      AST 14 U/L      ALT 12 U/L      Total Protein 8 4 (H) g/dL      Albumin 4 7 g/dL     Lipase [[id number]]  (Normal) Collected:  12/01/17 0606    Lab Status:  Final result Specimen:  Blood from Arm, Right Updated:  12/01/17 0645     Lipase 133 u/L     hCG, qualitative pregnancy [[id number]]  (Normal) Collected:  12/01/17 0606    Lab Status:  Final result Specimen:  Blood from Arm, Right Updated:  12/01/17 0645     Preg, Serum Negative    Basic metabolic panel [[id number]] Collected:  12/01/17 0606    Lab Status:  Final result Specimen:  Blood from Arm, Right Updated:  12/01/17 6636     Sodium 141 mmol/L      Potassium 3 6 mmol/L      Chloride 107 mmol/L      CO2 23 mmol/L      Anion Gap 11 mmol/L      BUN 10 mg/dL      Creatinine 0 70 mg/dL      Glucose 110 mg/dL      Calcium 10 0 mg/dL      eGFR 119 ml/min/1 73sq m     Narrative:         National Kidney Disease Education Program recommendations are as follows:  GFR calculation is accurate only with a steady state creatinine  Chronic Kidney disease less than 60 ml/min/1 73 sq  meters  Kidney failure less than 15 ml/min/1 73 sq  meters  Urine Microscopic [[id number]]  (Abnormal) Collected:  12/01/17 0607    Lab Status:  Final result Specimen:  Urine from Urine, Clean Catch Updated:  12/01/17 0628     RBC, UA 2-4 (A) /hpf      WBC, UA 4-10 (A) /hpf      Epithelial Cells Moderate (A) /hpf      Bacteria, UA Moderate (A) /hpf     UA w Reflex to Microscopic w Reflex to Culture [[id number]]  (Abnormal) Collected:  12/01/17 0607    Lab Status:  Final result Specimen:  Urine from Urine, Clean Catch Updated:  12/01/17 0622     Color, UA Yellow     Clarity, UA Cloudy     Specific [place], UA 1 020     pH, UA 8 5 (H)     Leukocytes, UA Trace (A)     Nitrite, UA Positive (A)     Protein, UA Trace (A) mg/dl      Glucose, UA Negative mg/dl      Ketones, UA Trace (A) mg/dl      Urobilinogen, UA 0 2 E U /dl      Bilirubin, UA Negative     Blood, UA Negative    CBC and differential [[id number]]  (Abnormal) Collected:  12/01/17 0606    Lab Status:  Final result Specimen:  Blood from Arm, Right Updated:  12/01/17 0621     WBC 12 59 (H) Thousand/uL      RBC 4 53 Million/uL      Hemoglobin 14 6 g/dL      Hematocrit 42 9 %      MCV 95 fL      MCH 32 2 pg      MCHC 34 0 g/dL      RDW 13 0 %      MPV 10 8 fL      Platelets 848 Thousands/uL      nRBC 0 /100 WBCs      Neutrophils Relative 81 (H) %      Lymphocytes Relative 13 (L) %      Monocytes Relative 5 %      Eosinophils Relative 0 %      Basophils Relative 1 %      Neutrophils Absolute 10 20 (H) Thousands/µL      Lymphocytes Absolute 1 65 Thousands/µL      Monocytes Absolute 0 62 Thousand/µL      Eosinophils Absolute 0 02 Thousand/µL      Basophils Absolute 0 06 Thousands/µL                  CT abdomen pelvis w contrast   Final Result by Pito Quarles MD (12/01 0719)         1  There is thickening of the gallbladder wall  Findings are concerning for acute cholecystitis  Consider ultrasound or DISIDA scan           Workstation performed: [id number]                    Procedures  Procedures       Phone Contacts  ED Phone Contact    ED Course  ED Course                 CT consistent with acute cholecystitis  Spoke with General surgery will admit  DR Borges                [facility] medical decision making 49-year-old female presents with right upper quadrant pain, CT consistent with cholecystitis, spoke with General surgery Dr Bart Lassiter will admit for cholecystectomy  CritCare Time    Disposition  Final diagnoses:   Cholecystitis     Time reflects when diagnosis was documented in both MDM as applicable and the Disposition within this note     Time User Action Codes Description Comment    12/1/2017  7:29 AM Narvis Schaumann Add [K81 9] Cholecystitis       ED Disposition     ED Disposition Condition Comment    Admit  Case was discussed with Dr Bart Lassiter  and the patient's admission status was agreed to be 2 midnights to service of Dr Claudine Laureano    None       Patient's Medications   Discharge Prescriptions    No medications on file     No discharge procedures on file      ED Provider  Electronically Signed by           Medardo Cabrera MD  12/01/17 2835

## 2017-12-01 NOTE — ANESTHESIA PREPROCEDURE EVALUATION
Review of Systems/Medical History  Patient summary reviewed  Chart reviewed      Cardiovascular  Negative cardio ROS Exercise tolerance: good,     Pulmonary  Smoker cigarette smoker fewer than 5 packs per year , ,        GI/Hepatic  Negative GI/hepatic ROS          Negative  ROS        Endo/Other  Negative endo/other ROS      GYN  Negative gynecology ROS          Hematology  Negative hematology ROS      Musculoskeletal  Obesity ,        Neurology  Negative neurology ROS      Psychology   Negative psychology ROS            Physical Exam    Airway    Mallampati score: II  TM Distance: >3 FB  Neck ROM: full     Dental   Comment: No loose,     Cardiovascular  Comment: Negative ROS, Rhythm: regular, Rate: normal,     Pulmonary  Breath sounds clear to auscultation,     Other Findings        Anesthesia Plan  ASA Score- 2       Anesthesia Type- general with ASA Monitors  Additional Monitors:   Airway Plan: ETT  Induction- intravenous  Informed Consent- Anesthetic plan and risks discussed with patient  I personally reviewed this patient with the CRNA  Discussed and agreed on the Anesthesia Plan with the CRNA  Raheel Abebe

## 2017-12-01 NOTE — ANESTHESIA POSTPROCEDURE EVALUATION
Post-Op Assessment Note      CV Status:  Stable    Mental Status:  Alert and awake    Hydration Status:  Stable    PONV Controlled:  None    Airway Patency:  Patent and adequate    Post Op Vitals Reviewed: Yes          Staff: Anesthesiologist, CRNA           BP   125/76   Temp   98 1   Pulse  65   Resp   18   SpO2   100%

## 2017-12-01 NOTE — H&P
GENERAL SURGERY HISTORY AND PHYSICAL      Breonna Ca 32 y o  female MRN: [id number]  Unit/Bed#: [facility] Encounter: [id number]      Assessment/Plan    Acute cholecystitis  -NPO  -IV fluids  -Ancef  -Dilaudid for pain        Chief Complaint last night I had pain in my right side that went around to my back for 7 hours that was severe  HPI: Breonna Ca is a 32y o  year old female who presents with severe right upper pain, dizziness, sweating, nausea  Patient states that she has been having this intermittent pain x1 year  She gets episodes where the pain is tolerable and she has nausea and vomiting but then it subsides  Last night the pain started again she was slightly nauseous but she did not vomit  The pain progressed to severe and it radiated around to the right side and around to her back  She was in the ER 2017 with similar pain and ultrasound was done at that time and was unremarkable  She has a history of constipation  She denies fever chills, diarrhea  Abdominal surgical history involves  x3  Last menstrual period was 2017  WBCs 12  CT of the abdomen shows thickening of the gallbladder wall, no mention of gallstones  ROS:  12 Point ROS reviewed and negative except for:  One year intermittent right upper quadrant pain with vomiting, spontaneously resolved  Complains of a mole on her back that has been there for a very long time it is painful  Abscess right lower site wisdom tooth extraction, was given antibiotic however she did not get that filled yet      Historical Information   History reviewed  No pertinent past medical history    Past Surgical History:   Procedure Laterality Date     SECTION       Social History   History   Alcohol Use    Yes     Comment: social     History   Drug Use No     History   Smoking Status    Current Every Day Smoker    Packs/day: 0 50   Smokeless Tobacco    Never Used     Family History: no pertinent family history  No Known Allergies  Meds/Allergies   current meds:   Current Facility-Administered Medications   Medication Dose Route Frequency    acetaminophen (TYLENOL) tablet 650 mg  650 mg Oral Q6H PRN    ceFAZolin (ANCEF) IVPB (premix) 2,000 mg  2,000 mg Intravenous Q8H    [START ON 2017] docusate sodium (COLACE) capsule 100 mg  100 mg Oral BID    enoxaparin (LOVENOX) subcutaneous injection 40 mg  40 mg Subcutaneous Daily    lactated ringers infusion  125 mL/hr Intravenous Continuous    morphine injection 2 mg  2 mg Intravenous Q4H PRN    nicotine (NICODERM CQ) 14 mg/24hr TD 24 hr patch 1 patch  1 patch Transdermal Daily    ondansetron (ZOFRAN) injection 4 mg  4 mg Intravenous Q6H PRN    sodium chloride 0 9 % infusion  125 mL/hr Intravenous Continuous         Objective   Vitals: Blood pressure 136/90, pulse 80, temperature 98 3 °F (36 8 °C), temperature source Oral, resp  rate 16, height 5' 2" (1 575 m), weight 77 5 kg (170 lb 13 7 oz), last menstrual period 2017, SpO2 100 %  ,Body mass index is 31 25 kg/m²  No intake or output data in the 24 hours ending 17 0830  Invasive Devices     Peripheral Intravenous Line            Peripheral IV 17 Right Antecubital less than 1 day                Physical Exam:    General appearance: alert, appears stated age and cooperative  HEENT: PERRLA, EOMI, sclera clear, anicterus, oral mucosa is  moist  Back: no tenderness,deformity,   Lungs:clear throughout  Heart[de-identified] RRR, S1, S2 normal, no murmur  Abdomen:  Soft, tenderness in the right upper quadrant without rebound, there is mild guarding, no masses, organomegaly, hernias    Well-healed  incision transverse lower abdomen, no hernias  Extremities: FROM no joint deformities, motor,sensory intact,pedal edema none  Skin:  No jaundice  Neurologic: CN II-XII grossly intact, no tremor, affect appropriate    Lab Results:   CBC with diff:   Lab Results   Component Value Date    WBC 12 59 (H) 2017    HGB 14 6 12/01/2017    HCT 42 9 12/01/2017    MCV 95 12/01/2017     12/01/2017    MCH 32 2 12/01/2017    MCHC 34 0 12/01/2017    RDW 13 0 12/01/2017    MPV 10 8 12/01/2017    NRBC 0 12/01/2017   , BMP/CMP:   Lab Results   Component Value Date     12/01/2017    K 3 6 12/01/2017     12/01/2017    CO2 23 12/01/2017    ANIONGAP 11 12/01/2017    BUN 10 12/01/2017    CREATININE 0 70 12/01/2017    GLUCOSE 110 12/01/2017    CALCIUM 10 0 12/01/2017    AST 14 12/01/2017    ALT 12 12/01/2017    ALKPHOS 58 12/01/2017    PROT 8 4 (H) 12/01/2017    ALBUMIN 4 7 12/01/2017    BILITOT 0 40 12/01/2017    EGFR 119 12/01/2017   , Urinalysis:   Lab Results   Component Value Date    COLORU Yellow 12/01/2017    CLARITYU Cloudy 12/01/2017    SPECGRAV 1 020 12/01/2017    PHUR 8 5 (H) 12/01/2017    LEUKOCYTESUR Trace (A) 12/01/2017    NITRITE Positive (A) 12/01/2017    PROTEINUA Trace (A) 12/01/2017    GLUCOSEU Negative 12/01/2017    KETONESU Trace (A) 12/01/2017    BILIRUBINUR Negative 12/01/2017    BLOODU Negative 12/01/2017     Imaging Studies: Ct Abdomen Pelvis W Contrast    Result Date: 12/1/2017  Impression: 1  There is thickening of the gallbladder wall  Findings are concerning for acute cholecystitis  Consider ultrasound or DISIDA scan   Workstation performed: [id number]       VTE Prophylaxis: Sequential compression device (Venodyne)  and Enoxaparin (Lovenox)     Code Status: Level 1 - Full Code  Advance Directive and Living Will:      Power of :    POLST:      Mary Kamara PA-C  12/1/2017

## 2017-12-01 NOTE — OP NOTE
OPERATIVE REPORT  PATIENT NAME: Chandana Mcallister    :  1990  MRN: [id number]  Pt Location: [facility] ROOM 04    SURGERY DATE: 2017    Surgeon(s) and Role:     * Alex Leyva MD - Primary     * Cher Fsih PA-C - Assisting    Preop Diagnosis:  Cholecystitis [K81 9]    Post-Op Diagnosis Codes:     * Cholecystitis [K81 9]     * Incarcerated umbilical hernia [J04 6]    Procedure(s) (LRB):  CHOLECYSTECTOMY LAPAROSCOPIC (N/A)  HERNIA REPAIR UMBILICAL LAPAROSCOPIC (N/A)    Specimen(s):  ID Type Source Tests Collected by Time Destination   1 : gallbladder and contents to pathology Tissue Gallbladder TISSUE EXAM Alex Leyva MD 2017 1259        Estimated Blood Loss:   Minimal    Drains:       Anesthesia Type:   General    Operative Indications:  Cholecystitis [K81 9]  Chronic intermittent right upper quadrant abdominal pain  Acutely worsened last night  CT showing distended gallbladder with wall thickening pericholecystic fluid  Leukocytosis  Operative Findings:  Distended acutely inflamed gallbladder with pericholecystic edema  Palpable gallstone  Fat containing incarcerated umbilical hernia repaired primarily  Complications:   None    Procedure and Technique:  Patient was transferred to the operating table  Anesthesia was introduced with no complication  Patient was placed into the supine position  Surgical site was prepped and draped in a sterile fashion  Preop timeout was performed with all members of the surgical staff present, all agreed on the patient identifiers as well as the procedure to be performed  Local anesthetic was infused in the left upper quadrant at palmers point  A 15 blade scalpel was utilized to make a 5 mm incision  Laparoscope was introduced under direct optiview visualization through a 5 mm trocar  Gas insufflation was initiated, cavity was evaluated there is no damage any critical structures upon entering   An additional 10 mm trochar was placed supraumbilical through a umbilical hernia defect, with incarcerated preperitoneal fat, and two 5 mm trochars were introduced in the RUQ, all under direct visualization  With all trochars in place patient was placed in reverse Trendelenburg and tilted to the left  Evaluation of the right upper quadrant showed a gallbladder that was distended and acutely inflamed with pericholecystic edema    Matos's pouch was retracted and a combination of fine and blunt dissection was utilized to incise the overlying peritoneum on the gallbladder  Gallbladder is retracted out from the gallbladder fossa and dissection was continued at the area of Calot's triangle  A critical view was established showing the cystic artery and duct ending on the gallbladder with the cystic plate opened up in its entirety  Cystic duct was clipped 3 proximal and one distal   Cystic arteries noted to have 2 separate branches 1 anterior and 1 posterior  Each was clipped once proximally distally  All structure was sharply incised  Gallbladder was then removed from the gallbladder fossae utilizing electrocautery  Gallbladder placed in Endo Catch bag and removed through the supraumbilical incision  Gallbladder fossa was evaluated, all bleeding was controlled with electrocautery and the placement of FloSeal  There was no evidence of a bile leak  Clips were in good position on the cystic duct and artery  Any accumulated blood clot was suctioned  Final evaluation of the abdominal cavity showed no damage to any critical structures, no uncontrolled hemorrhage, or any other acute pathology  Patient was leveled and several 0 Vicryl on a suture passer were utilized to reapproximate the fascia at the umbilicus also repairing hernia defect  Abdomen was completely desufflated and all trochars were removed  Further local anesthetic was infused into all incisions and they were all closed with a 4-0 Monocryl  Final dressing for all incisions was histacryl       I was present for the entire procedure, A qualified resident physician was not available and A physician assistant was required during the procedure for retraction tissue handling,dissection and suturing    Patient Disposition:  PACU     SIGNATURE: Roseanne Blanchard MD  DATE: December 1, 2017  TIME: 1:37 PM

## 2017-12-02 RX ORDER — OXYCODONE HYDROCHLORIDE AND ACETAMINOPHEN 5; 325 MG/1; MG/1
1 TABLET ORAL EVERY 4 HOURS PRN
Status: DISCONTINUED | OUTPATIENT
Start: 2017-12-02 | End: 2017-12-02

## 2017-12-02 RX ORDER — OXYCODONE HYDROCHLORIDE AND ACETAMINOPHEN 5; 325 MG/1; MG/1
2 TABLET ORAL EVERY 4 HOURS PRN
Status: DISCONTINUED | OUTPATIENT
Start: 2017-12-02 | End: 2017-12-03 | Stop reason: HOSPADM

## 2017-12-02 RX ORDER — DIPHENHYDRAMINE HCL 25 MG
25 TABLET ORAL EVERY 6 HOURS PRN
Status: DISCONTINUED | OUTPATIENT
Start: 2017-12-02 | End: 2017-12-03 | Stop reason: HOSPADM

## 2017-12-02 RX ORDER — MORPHINE SULFATE 4 MG/ML
4 INJECTION, SOLUTION INTRAMUSCULAR; INTRAVENOUS EVERY 4 HOURS PRN
Status: DISCONTINUED | OUTPATIENT
Start: 2017-12-02 | End: 2017-12-03

## 2017-12-02 RX ADMIN — MORPHINE SULFATE 4 MG: 4 INJECTION, SOLUTION INTRAMUSCULAR; INTRAVENOUS at 17:24

## 2017-12-02 RX ADMIN — DOCUSATE SODIUM 100 MG: 100 CAPSULE, LIQUID FILLED ORAL at 17:25

## 2017-12-02 RX ADMIN — OXYCODONE HYDROCHLORIDE AND ACETAMINOPHEN 2 TABLET: 5; 325 TABLET ORAL at 23:06

## 2017-12-02 RX ADMIN — MORPHINE SULFATE 4 MG: 4 INJECTION, SOLUTION INTRAMUSCULAR; INTRAVENOUS at 21:30

## 2017-12-02 RX ADMIN — DOCUSATE SODIUM 100 MG: 100 CAPSULE, LIQUID FILLED ORAL at 08:47

## 2017-12-02 RX ADMIN — MORPHINE SULFATE 4 MG: 4 INJECTION, SOLUTION INTRAMUSCULAR; INTRAVENOUS at 11:29

## 2017-12-02 RX ADMIN — OXYCODONE HYDROCHLORIDE AND ACETAMINOPHEN 2 TABLET: 5; 325 TABLET ORAL at 16:20

## 2017-12-02 RX ADMIN — HYDROCODONE BITARTRATE AND ACETAMINOPHEN 2 TABLET: 5; 325 TABLET ORAL at 03:38

## 2017-12-02 RX ADMIN — OXYCODONE HYDROCHLORIDE AND ACETAMINOPHEN 2 TABLET: 5; 325 TABLET ORAL at 09:57

## 2017-12-02 RX ADMIN — MORPHINE SULFATE 2 MG: 2 INJECTION, SOLUTION INTRAMUSCULAR; INTRAVENOUS at 03:04

## 2017-12-02 RX ADMIN — SODIUM CHLORIDE, SODIUM LACTATE, POTASSIUM CHLORIDE, AND CALCIUM CHLORIDE 125 ML/HR: .6; .31; .03; .02 INJECTION, SOLUTION INTRAVENOUS at 01:52

## 2017-12-02 NOTE — PROGRESS NOTES
Progress Note - Jose Alberto Love 32 y o  female MRN: [id number]    Unit/Bed#: -01 Encounter: [id number]      Assessment:  POD # one s/p laparoscopic cholecystectomy for acute cholecystitis  Postop pain secondary to retained CO2    Plan:  Switch pain meds from Norco to Percocet  Encourage ambulation to improve gas pain  Reassess tomorrow for possible discharge    Subjective:   Tender at the umbilical incision as well as right flank and shoulder  Tolerating diet with no nausea or vomiting  Feels pain meds are not properly controlling her pain  Did not sleep well  Objective:     Vitals: Blood pressure 118/64, pulse 94, temperature 98 2 °F (36 8 °C), temperature source Oral, resp  rate 17, height 5' 2" (1 575 m), weight 76 6 kg (168 lb 14 oz), last menstrual period 11/14/2017, SpO2 99 %  ,Body mass index is 30 89 kg/m²  Intake/Output Summary (Last 24 hours) at 12/02/17 0923  Last data filed at 12/01/17 1845   Gross per 24 hour   Intake          2073 33 ml   Output              300 ml   Net          1773 33 ml       Physical Exam:  Awake alert oriented no acute distress  Abdomen is soft nondistended tender to palpation at the incision       Invasive Devices     Peripheral Intravenous Line            Peripheral IV 12/01/17 Right Antecubital 1 day

## 2017-12-03 VITALS
BODY MASS INDEX: 31.08 KG/M2 | SYSTOLIC BLOOD PRESSURE: 101 MMHG | HEIGHT: 62 IN | TEMPERATURE: 98.8 F | OXYGEN SATURATION: 100 % | WEIGHT: 168.87 LBS | DIASTOLIC BLOOD PRESSURE: 57 MMHG | RESPIRATION RATE: 18 BRPM | HEART RATE: 63 BPM

## 2017-12-03 RX ORDER — DOCUSATE SODIUM 100 MG/1
100 CAPSULE, LIQUID FILLED ORAL DAILY PRN
Qty: 10 CAPSULE | Refills: 0 | COMMUNITY
Start: 2017-12-03

## 2017-12-03 RX ORDER — OXYCODONE HYDROCHLORIDE AND ACETAMINOPHEN 5; 325 MG/1; MG/1
2 TABLET ORAL EVERY 4 HOURS PRN
Qty: 40 TABLET | Refills: 0 | Status: SHIPPED | OUTPATIENT
Start: 2017-12-03 | End: 2017-12-13

## 2017-12-03 RX ADMIN — MORPHINE SULFATE 4 MG: 4 INJECTION, SOLUTION INTRAMUSCULAR; INTRAVENOUS at 06:41

## 2017-12-03 RX ADMIN — OXYCODONE HYDROCHLORIDE AND ACETAMINOPHEN 2 TABLET: 5; 325 TABLET ORAL at 04:42

## 2017-12-03 RX ADMIN — DOCUSATE SODIUM 100 MG: 100 CAPSULE, LIQUID FILLED ORAL at 08:52

## 2017-12-03 RX ADMIN — MORPHINE SULFATE 4 MG: 4 INJECTION, SOLUTION INTRAMUSCULAR; INTRAVENOUS at 11:27

## 2017-12-03 RX ADMIN — OXYCODONE HYDROCHLORIDE AND ACETAMINOPHEN 2 TABLET: 5; 325 TABLET ORAL at 08:52

## 2017-12-03 RX ADMIN — MORPHINE SULFATE 4 MG: 4 INJECTION, SOLUTION INTRAMUSCULAR; INTRAVENOUS at 02:32

## 2017-12-03 NOTE — DISCHARGE SUMMARY
Discharge Summary - Zach Byrd 32 y o  female MRN: [id number]    Unit/Bed#: -01 Encounter: [id number]    Admission Date: 12/1/2017     Admitting Diagnosis: Cholecystitis [K81 9]  Rib pain [R07 81]    HPI:  Patient presenting with the chronic history of abdominal pain with acute exacerbation  Evaluation in the ER with CT shows concern for cholecystitis  Minimal leukocytosis 12,000  Patient was admitted with the plan for laparoscopic cholecystectomy  Procedures Performed:  Laparoscopic cholecystectomy and laparoscopic umbilical hernia repair on 12/01    Hospital Course:  Patient underwent uneventful laparoscopic cholecystectomy and laparoscopic umbilical hernia repair on 12/01  On date of discharge tolerating diet pain well controlled ambulating with no difficulty vitals than normal limits  Significant Findings, Care, Treatment and Services Provided:  CT positive cholecystitis    Complications:  None    Discharge Diagnosis:  Acute cholecystitis, reducible umbilical hernia    Condition at Discharge: good     Discharge instructions/Information to patient and family:   See after visit summary for information provided to patient and family  Provisions for Follow-Up Care:  See after visit summary for information related to follow-up care and any pertinent home health orders  Disposition: Home    Planned Readmission: No    Discharge Statement   I spent 30 minutes discharging the patient  This time was spent on the day of discharge  I had direct contact with the patient on the day of discharge  Additional documentation is required if more than 30 minutes were spent on discharge  Discharge Medications:  See after visit summary for reconciled discharge medications provided to patient and family

## 2017-12-03 NOTE — PROGRESS NOTES
Progress Note - Lea Cluster 32 y o  female MRN: [id number]    Unit/Bed#: -01 Encounter: [id number]      Assessment:  POD # 2 s/p laparoscopic cholecystectomy for acute cholecystitis  Plan:  Okay for DC  Follow-up in 2 weeks    Subjective:   Pain much improved  Tolerating diet no difficulty ready for discharge  Objective:     Vitals: Blood pressure 101/57, pulse 63, temperature 98 8 °F (37 1 °C), temperature source Oral, resp  rate 18, height 5' 2" (1 575 m), weight 76 6 kg (168 lb 14 oz), last menstrual period 11/14/2017, SpO2 100 %  ,Body mass index is 30 89 kg/m²  No intake or output data in the 24 hours ending 12/03/17 1136    Physical Exam:  Awake alert oriented no acute distress  Abdomen is soft nondistended tender to palpation at the incision       Invasive Devices     Peripheral Intravenous Line            Peripheral IV 12/01/17 Right Antecubital 2 days

## 2017-12-04 NOTE — CASE MANAGEMENT
Initial Clinical Review    Admission: Date/Time/Statement: 17 @ 0730     Orders Placed This Encounter   Procedures    Inpatient Admission (expected length of stay for this patient is greater than two midnights)     Standing Status:   Standing     Number of Occurrences:   1     Order Specific Question:   Admitting Physician     Answer:   Dafne Speaker [[id number]]     Order Specific Question:   Level of Care     Answer:   Med Surg [16]     Order Specific Question:   Estimated length of stay     Answer:   Inpatient Only Surgery         ED: Date/Time/Mode of Arrival:   ED Arrival Information     Expected Arrival Acuity Means of Arrival Escorted By Service Admission Type    - 2017 05:37 Urgent Walk-In Self Surgery-General Urgent    Arrival Complaint    flank pain          Chief Complaint:   Chief Complaint   Patient presents with    Rib Pain     Pt c/o rib pain that started lastnight at work  Pt reports pain radiates "around lower back and entire stomach " Pt states "I was here two months ago they told me it was my gallbladder and to come back if the pain comes back "       History of Illness: Chaparrita Pizano is a 32y o  year old female who presents with severe right upper pain, dizziness, sweating, nausea  Patient states that she has been having this intermittent pain x1 year  She gets episodes where the pain is tolerable and she has nausea and vomiting but then it subsides  Last night the pain started again she was slightly nauseous but she did not vomit  The pain progressed to severe and it radiated around to the right side and around to her back  She was in the ER 2017 with similar pain and ultrasound was done at that time and was unremarkable  She has a history of constipation  She denies fever chills, diarrhea    Abdominal surgical history involves  x3  Last menstrual period was 2017  WBCs 12  CT of the abdomen shows thickening of the gallbladder wall, no mention of gallstones      ED Vital Signs:   ED Triage Vitals [12/01/17 0547]   Temperature Pulse Respirations Blood Pressure SpO2   98 3 °F (36 8 °C) 78 20 133/84 100 %      Temp Source Heart Rate Source Patient Position - Orthostatic VS BP Location FiO2 (%)   Oral Monitor Sitting Right arm --      Pain Score       Worst Possible Pain        Wt Readings from Last 1 Encounters:   12/01/17 76 6 kg (168 lb 14 oz)       Vital Signs (abnormal): wnl     Abnormal Labs/Diagnostic Test Results: total prot 8 4   pH, UA 8 5 (H) 4 5 - 8 0     Leukocytes, UA Trace (A) Negative     Nitrite, UA Positive (A) Negative     Protein, UA Trace (A) Negative mg/dl     Glucose, UA Negative Negative mg/dl     Ketones, UA Trace (A) Negative mg/dl    Wbc 12 59  CT abd-    1   There is thickening of the gallbladder wall   Findings are concerning for acute cholecystitis          ED Treatment:   Medication Administration from 12/01/2017 0537 to 12/01/2017 0831       Date/Time Order Dose Route Action Action by Comments     12/01/2017 0607 morphine (PF) 10 mg/mL injection 6 mg 6 mg Intravenous Given Hardy Gowers, RN      12/01/2017 0607 ondansetron (ZOFRAN) injection 4 mg 4 mg Intravenous Given Hardy Gowers, RN      12/01/2017 0759 sodium chloride 0 9 % bolus 1,000 mL 0 mL Intravenous Stopped Bruna Chau, MARLENE      12/01/2017 0616 sodium chloride 0 9 % bolus 1,000 mL 1,000 mL Intravenous New Bag Hardy Gowers, RN      12/01/2017 0654 iohexol (OMNIPAQUE) 350 MG/ML injection (MULTI-DOSE) 100 mL 100 mL Intravenous Given Trista Obando      12/01/2017 0714 ketorolac (TORADOL) 30 mg/mL injection 15 mg 15 mg Intravenous Given Bruna Chau RN      12/01/2017 0757 morphine (PF) 4 mg/mL injection 4 mg 4 mg Intravenous Given Bruna Chau RN           Past Medical/Surgical History:    Active Ambulatory Problems     Diagnosis Date Noted    No Active Ambulatory Problems     Resolved Ambulatory Problems     Diagnosis Date Noted    No Resolved Ambulatory Problems     No Additional Past Medical History       Admitting Diagnosis: Cholecystitis [K81 9]  Rib pain [R07 81]    Age/Sex: 32 y o  female    Assessment/Plan:  Acute cholecystitis  -NPO  -IV fluids  -Ancef  -Dilaudid for pain       Admission Orders:  Scheduled Meds:   Continuous Infusions:   No current facility-administered medications for this encounter  PRN Meds:     Po colace, percocet , tylenol   Act as julianna   Iv zofran   Iv ancef q8hr  Iv lr @ 125/hr  Up and OOB as julianna   I&O   nicoderm patch qd   Sq lovenox qd   Reg diet  Iv morphine q4h prn   Iv benadryl q6h prn     General surgical progress note 12/3  Assessment:  POD # 2 s/p laparoscopic cholecystectomy for acute cholecystitis    Plan:  Okay for DC  Follow-up in 2 weeks    General surgical progress note 12/2  Assessment:  POD # one s/p laparoscopic cholecystectomy for acute cholecystitis    Postop pain secondary to retained CO2     Plan:  Switch pain meds from Norco to Percocet  Encourage ambulation to improve gas pain  Reassess tomorrow for possible discharge     OP note 12/1  CHOLECYSTECTOMY LAPAROSCOPIC (N/A)  HERNIA REPAIR UMBILICAL LAPAROSCOPIC (N/A)

## 2017-12-05 NOTE — PHYSICIAN ADVISOR
Current patient class: Inpatient  The patient is currently on Hospital Day: 3      The patient was admitted to the hospital at 0730 on 12/1/17 for the following diagnosis:  Cholecystitis [K81 9]  Rib pain [R07 81]       There is documentation in the medical record of an expected length of stay of at least 2 midnights  The patient is therefore expected to satisfy the 2 midnight benchmark and given the 2 midnight presumption is appropriate for INPATIENT ADMISSION  Given this expectation of a satisfying stay, CMS instructs us that the patient is most often appropriate for inpatient admission under part A provided medical necessity is documented in the chart  After review of the relevant documentation, labs, vital signs and test results, the patient is appropriate for INPATIENT ADMISSION  Admission to the hospital as an inpatient is a complex decision making process which requires the practitioner to consider the patients presenting complaint, history and physical examination and all relevant testing  With this in mind, in this case, the patient was deemed appropriate for INPATIENT ADMISSION  After review of the documentation and testing available at the time of the admission I concur with this clinical determination of medical necessity  Rationale is as follows: The patient is a 32 yrs old Female who presented to the ED at 12/1/2017  5:41 AM with a chief complaint of Rib Pain (Pt c/o rib pain that started lastnight at work  Pt reports pain radiates "around lower back and entire stomach " Pt states "I was here two months ago they told me it was my gallbladder and to come back if the pain comes back  ")    The patients vitals on arrival were ED Triage Vitals [12/01/17 0547]   Temperature Pulse Respirations Blood Pressure SpO2   98 3 °F (36 8 °C) 78 20 133/84 100 %      Temp Source Heart Rate Source Patient Position - Orthostatic VS BP Location FiO2 (%)   Oral Monitor Sitting Right arm --      Pain Score Worst Possible Pain           History reviewed  No pertinent past medical history  Past Surgical History:   Procedure Laterality Date     SECTION      CHOLECYSTECTOMY LAPAROSCOPIC N/A 2017    Procedure: CHOLECYSTECTOMY LAPAROSCOPIC;  Surgeon: Katharine Dick MD;  Location: [facility];  Service: General    DENTAL SURGERY      UMBILICAL HERNIA REPAIR LAPAROSCOPIC N/A 2017    Procedure: HERNIA REPAIR UMBILICAL LAPAROSCOPIC;  Surgeon: Katharine Dick MD;  Location: [facility];  Service: General           Consults have been placed to:   None    Vitals:    17 0655 17 1500 17 2306 17 0638   BP: 118/64 98/62 90/55 101/57   Pulse: 94 69 68 63   Resp: 17 18 18 18   Temp: 98 2 °F (36 8 °C) 97 5 °F (36 4 °C) 98 2 °F (36 8 °C) 98 8 °F (37 1 °C)   TempSrc: Oral Oral Oral Oral   SpO2: 99% 100% 100% 100%   Weight:       Height:           Most recent labs:    No results for input(s): WBC, HGB, HCT, PLT, K, NA, CALCIUM, BUN, CREATININE, LIPASE, AMYLASE, INR, TROPONINI, CKTOTAL, AST, ALT, ALKPHOS, BILITOT in the last 72 hours  Scheduled Meds:  Continuous Infusions:  No current facility-administered medications for this encounter  PRN Meds:      Surgical procedures (if appropriate):  Procedure(s):  CHOLECYSTECTOMY LAPAROSCOPIC  HERNIA REPAIR UMBILICAL LAPAROSCOPIC

## 2017-12-08 ENCOUNTER — HOSPITAL ENCOUNTER (EMERGENCY)
Facility: HOSPITAL | Age: 27
Discharge: HOME | End: 2017-12-08
Admitting: EMERGENCY MEDICINE
Payer: COMMERCIAL

## 2017-12-08 ENCOUNTER — APPOINTMENT (EMERGENCY)
Dept: CT IMAGING | Facility: HOSPITAL | Age: 27
End: 2017-12-08
Payer: COMMERCIAL

## 2017-12-08 VITALS
DIASTOLIC BLOOD PRESSURE: 57 MMHG | OXYGEN SATURATION: 100 % | BODY MASS INDEX: 32.42 KG/M2 | SYSTOLIC BLOOD PRESSURE: 101 MMHG | HEART RATE: 78 BPM | RESPIRATION RATE: 14 BRPM | WEIGHT: 176.15 LBS | HEIGHT: 62 IN | TEMPERATURE: 99.1 F

## 2017-12-08 DIAGNOSIS — G89.18 POST-OPERATIVE PAIN: Primary | ICD-10-CM

## 2017-12-08 LAB
ALBUMIN SERPL BCP-MCNC: 3.6 G/DL (ref 3.5–5)
ALP SERPL-CCNC: 186 U/L (ref 46–116)
ALT SERPL W P-5'-P-CCNC: 112 U/L (ref 12–78)
ANION GAP SERPL CALCULATED.3IONS-SCNC: 8 MMOL/L (ref 4–13)
AST SERPL W P-5'-P-CCNC: 54 U/L (ref 5–45)
BASOPHILS # BLD AUTO: 0.06 THOUSANDS/ΜL (ref 0–0.1)
BASOPHILS NFR BLD AUTO: 1 % (ref 0–1)
BILIRUB SERPL-MCNC: 0.4 MG/DL (ref 0.2–1)
BUN SERPL-MCNC: 6 MG/DL (ref 5–25)
CALCIUM SERPL-MCNC: 9.5 MG/DL (ref 8.3–10.1)
CHLORIDE SERPL-SCNC: 102 MMOL/L (ref 100–108)
CO2 SERPL-SCNC: 30 MMOL/L (ref 21–32)
CREAT SERPL-MCNC: 0.54 MG/DL (ref 0.6–1.3)
EOSINOPHIL # BLD AUTO: 0.44 THOUSAND/ΜL (ref 0–0.61)
EOSINOPHIL NFR BLD AUTO: 5 % (ref 0–6)
ERYTHROCYTE [DISTWIDTH] IN BLOOD BY AUTOMATED COUNT: 13.2 % (ref 11.6–15.1)
EXT PREG TEST URINE: NEGATIVE
GFR SERPL CREATININE-BSD FRML MDRD: 130 ML/MIN/1.73SQ M
GLUCOSE SERPL-MCNC: 108 MG/DL (ref 65–140)
HCT VFR BLD AUTO: 39.4 % (ref 34.8–46.1)
HGB BLD-MCNC: 12.9 G/DL (ref 11.5–15.4)
LYMPHOCYTES # BLD AUTO: 2.27 THOUSANDS/ΜL (ref 0.6–4.47)
LYMPHOCYTES NFR BLD AUTO: 24 % (ref 14–44)
MCH RBC QN AUTO: 31.9 PG (ref 26.8–34.3)
MCHC RBC AUTO-ENTMCNC: 32.7 G/DL (ref 31.4–37.4)
MCV RBC AUTO: 97 FL (ref 82–98)
MONOCYTES # BLD AUTO: 0.67 THOUSAND/ΜL (ref 0.17–1.22)
MONOCYTES NFR BLD AUTO: 7 % (ref 4–12)
NEUTROPHILS # BLD AUTO: 6.02 THOUSANDS/ΜL (ref 1.85–7.62)
NEUTS SEG NFR BLD AUTO: 64 % (ref 43–75)
NRBC BLD AUTO-RTO: 0 /100 WBCS
PLATELET # BLD AUTO: 322 THOUSANDS/UL (ref 149–390)
PMV BLD AUTO: 10 FL (ref 8.9–12.7)
POTASSIUM SERPL-SCNC: 3.9 MMOL/L (ref 3.5–5.3)
PROT SERPL-MCNC: 7.9 G/DL (ref 6.4–8.2)
RBC # BLD AUTO: 4.05 MILLION/UL (ref 3.81–5.12)
SODIUM SERPL-SCNC: 140 MMOL/L (ref 136–145)
WBC # BLD AUTO: 9.49 THOUSAND/UL (ref 4.31–10.16)

## 2017-12-08 PROCEDURE — 80053 COMPREHEN METABOLIC PANEL: CPT | Performed by: PHYSICIAN ASSISTANT

## 2017-12-08 PROCEDURE — 74177 CT ABD & PELVIS W/CONTRAST: CPT

## 2017-12-08 PROCEDURE — 85025 COMPLETE CBC W/AUTO DIFF WBC: CPT | Performed by: PHYSICIAN ASSISTANT

## 2017-12-08 PROCEDURE — 36415 COLL VENOUS BLD VENIPUNCTURE: CPT | Performed by: PHYSICIAN ASSISTANT

## 2017-12-08 PROCEDURE — 96374 THER/PROPH/DIAG INJ IV PUSH: CPT

## 2017-12-08 PROCEDURE — 81025 URINE PREGNANCY TEST: CPT | Performed by: PHYSICIAN ASSISTANT

## 2017-12-08 PROCEDURE — 99284 EMERGENCY DEPT VISIT MOD MDM: CPT

## 2017-12-08 RX ORDER — NAPROXEN 500 MG/1
500 TABLET ORAL 2 TIMES DAILY PRN
Qty: 14 TABLET | Refills: 0 | Status: SHIPPED | OUTPATIENT
Start: 2017-12-08

## 2017-12-08 RX ORDER — KETOROLAC TROMETHAMINE 30 MG/ML
30 INJECTION, SOLUTION INTRAMUSCULAR; INTRAVENOUS ONCE
Status: COMPLETED | OUTPATIENT
Start: 2017-12-08 | End: 2017-12-08

## 2017-12-08 RX ADMIN — KETOROLAC TROMETHAMINE 30 MG: 30 INJECTION, SOLUTION INTRAMUSCULAR at 16:52

## 2017-12-08 RX ADMIN — IOHEXOL 100 ML: 350 INJECTION, SOLUTION INTRAVENOUS at 15:35

## 2017-12-08 NOTE — ED PROVIDER NOTES
History  Chief Complaint   Patient presents with    Abdominal Pain     pt states she was d/c from here last   Pt had hernia repair and cholisystectomy done  Pt now states " I think I have another hernia" c/o hard lump bellow belly button  Keara Krishna is a 32 y o  female w PMH none significant who presents for evaluation of abdominal pain  Patient with postoperative abdominal pain  On  she underwent laparoscopic cholecystectomy and umbilical hernia repair  She was healing well and taking Percocet for analgesia  However yesterday had acutely worsening periumbilical pain  Has been constant overnight  Unrelieved with Percocet at  No nausea, vomiting, diarrhea or constipation  No urinary symptoms  Denies f/c  Prior to Admission Medications   Prescriptions Last Dose Informant Patient Reported? Taking?   docusate sodium (COLACE) 100 mg capsule   No No   Sig: Take 1 capsule by mouth daily as needed for constipation (While taking narcotics)   oxyCODONE-acetaminophen (PERCOCET) 5-325 mg per tablet   No No   Sig: Take 2 tablets by mouth every 4 (four) hours as needed for moderate pain for up to 10 days Max Daily Amount: 12 tablets      Facility-Administered Medications: None       History reviewed  No pertinent past medical history  Past Surgical History:   Procedure Laterality Date     SECTION      CHOLECYSTECTOMY LAPAROSCOPIC N/A 2017    Procedure: CHOLECYSTECTOMY LAPAROSCOPIC;  Surgeon: Kyle Bautista MD;  Location: [facility];  Service: General    DENTAL SURGERY      UMBILICAL HERNIA REPAIR LAPAROSCOPIC N/A 2017    Procedure: HERNIA REPAIR UMBILICAL LAPAROSCOPIC;  Surgeon: Kyle Bautista MD;  Location: [facility];  Service: General       No family history on file  I have reviewed and agree with the history as documented      Social History   Substance Use Topics    Smoking status: Current Every Day Smoker     Packs/day: 0 50    Smokeless tobacco: Never Used    Alcohol use Yes      Comment: social        Review of Systems   Constitutional: Negative for chills, diaphoresis and fever  HENT: Negative for congestion and sore throat  Eyes: Negative for visual disturbance  Respiratory: Negative for cough, chest tightness, shortness of breath and wheezing  Cardiovascular: Negative for chest pain and leg swelling  Gastrointestinal: Positive for abdominal pain  Negative for constipation, diarrhea, nausea and vomiting  Genitourinary: Negative for difficulty urinating, dysuria, frequency, hematuria, urgency, vaginal bleeding, vaginal discharge and vaginal pain  Musculoskeletal: Negative for arthralgias and myalgias  Neurological: Negative for dizziness, weakness, light-headedness, numbness and headaches  Psychiatric/Behavioral: The patient is not nervous/anxious  Physical Exam  ED Triage Vitals [12/08/17 1422]   Temperature Pulse Respirations Blood Pressure SpO2   99 1 °F (37 3 °C) 89 18 137/81 100 %      Temp Source Heart Rate Source Patient Position - Orthostatic VS BP Location FiO2 (%)   Oral Monitor Lying Right arm --      Pain Score       9           Orthostatic Vital Signs  Vitals:    12/08/17 1422 12/08/17 1634   BP: 137/81 101/57   Pulse: 89 78   Patient Position - Orthostatic VS: Lying Lying       Physical Exam   Constitutional: She is oriented to person, place, and time  She appears well-developed and well-nourished  No distress  HENT:   Head: Normocephalic and atraumatic  Eyes: Pupils are equal, round, and reactive to light  Neck: Normal range of motion  Neck supple  No tracheal deviation present  Cardiovascular: Normal rate, regular rhythm, normal heart sounds and intact distal pulses  Exam reveals no gallop and no friction rub  No murmur heard  Pulmonary/Chest: Effort normal and breath sounds normal  No respiratory distress  She has no wheezes  She has no rales  Abdominal: Soft   Bowel sounds are normal  She exhibits no distension and no mass  Tenderness: mild ttp of the perimbilical region  There is no guarding  Incisions c/d/i   Palpable nodular structure at umbilicus that is not fluctuance, no erythema / warmth  Faint ecchymosis of RLQ / beneath umbilicus   Musculoskeletal: She exhibits no edema or deformity  Neurological: She is alert and oriented to person, place, and time  Skin: Skin is warm and dry  She is not diaphoretic  Psychiatric: She has a normal mood and affect  Her behavior is normal    Nursing note and vitals reviewed  ED Medications  Medications   iohexol (OMNIPAQUE) 350 MG/ML injection (MULTI-DOSE) 100 mL (100 mL Intravenous Given 12/8/17 1535)   ketorolac (TORADOL) injection 30 mg (30 mg Intravenous Given 12/8/17 1652)       Diagnostic Studies  Results Reviewed     Procedure Component Value Units Date/Time    Comprehensive metabolic panel [[id number]]  (Abnormal) Collected:  12/08/17 1448    Lab Status:  Final result Specimen:  Blood from Arm, Left Updated:  12/08/17 1508     Sodium 140 mmol/L      Potassium 3 9 mmol/L      Chloride 102 mmol/L      CO2 30 mmol/L      Anion Gap 8 mmol/L      BUN 6 mg/dL      Creatinine 0 54 (L) mg/dL      Glucose 108 mg/dL      Calcium 9 5 mg/dL      AST 54 (H) U/L       (H) U/L      Alkaline Phosphatase 186 (H) U/L      Total Protein 7 9 g/dL      Albumin 3 6 g/dL      Total Bilirubin 0 40 mg/dL      eGFR 130 ml/min/1 73sq m     Narrative:         National Kidney Disease Education Program recommendations are as follows:  GFR calculation is accurate only with a steady state creatinine  Chronic Kidney disease less than 60 ml/min/1 73 sq  meters  Kidney failure less than 15 ml/min/1 73 sq  meters      CBC and differential [[id number]]  (Normal) Collected:  12/08/17 1448    Lab Status:  Final result Specimen:  Blood from Arm, Left Updated:  12/08/17 1453     WBC 9 49 Thousand/uL      RBC 4 05 Million/uL      Hemoglobin 12 9 g/dL      Hematocrit 39 4 %      MCV 97 fL MCH 31 9 pg      MCHC 32 7 g/dL      RDW 13 2 %      MPV 10 0 fL      Platelets 773 Thousands/uL      nRBC 0 /100 WBCs      Neutrophils Relative 64 %      Lymphocytes Relative 24 %      Monocytes Relative 7 %      Eosinophils Relative 5 %      Basophils Relative 1 %      Neutrophils Absolute 6 02 Thousands/µL      Lymphocytes Absolute 2 27 Thousands/µL      Monocytes Absolute 0 67 Thousand/µL      Eosinophils Absolute 0 44 Thousand/µL      Basophils Absolute 0 06 Thousands/µL     POCT pregnancy, urine [[id number]]  (Normal) Resulted:  12/08/17 1449    Lab Status:  Final result Updated:  12/08/17 1449     EXT PREG TEST UR (Ref: Negative) Negative                 CT abdomen pelvis with contrast   Final Result by Loly Hutchinson MD (12/08 1620)      Status post cholecystectomy  There is gas and fluid within the gallbladder fossa which could be postoperative or related to infection or less likely biliary leak, correlate clinically  There is also fluid medial and inferior to the surgical clips  Workstation performed: [id number]                    Procedures  Procedures       Phone Contacts  ED Phone Contact    ED Course  ED Course                                MDM  Number of Diagnoses or Management Options  Post-operative pain:   Diagnosis management comments: DDX includes but not ltd to:   Concern for recurrent hernia, post operative infection vs pain and inflammation     Plan is to obtain:  CBC as marker of infectivity, hemoconcentration for hydration status  CMP to check for electrolytes, renal function, liver function   CT a/p to check for acute intra-abdominal pathology to explain symptomatology     Based on results:  CT a/p reviewed by myself and read formally  She does have inflammatory stranding of periumbilical region at trochar / incisional site  She has no hernia here  Possible seroma   Doubt developing abscess as this region is palpable on abdominal exam and there is no fluctuance, there is no erythema / warmth of the skin surface  There is some free fluid and gas in the gallbladder fossa which is likely post surgical given most of her pain is in the periumbilical region rather than the RUQ  She only has mild appropriate post surgical ttp elsewhere  She has routine follow up w Dr Suellen Jackson in a week  I encouraged her to keep the appointment  I advised she return if she develops f/c or worsening pain  Return parameters discussed  Pt requires f/u as an outpt  Pt expresses understanding w above treatment plan  All questions answered prior to d/c  Portions of the record may have been created with voice recognition software   Occasional wrong word or "sound a like" substitutions may have occurred due to the inherent limitations of voice recognition software   Read the chart carefully and recognize, using context, where substitutions have occurred  CritCare Time    Disposition  Final diagnoses:   Post-operative pain     Time reflects when diagnosis was documented in both MDM as applicable and the Disposition within this note     Time User Action Codes Description Comment    12/8/2017  4:59 PM Levobenjamin Osorioing Add [G89 18] Post-operative pain       ED Disposition     ED Disposition Condition Comment    Discharge  [facility] (DP/SNF) discharge to home/self care      Condition at discharge: Good        Follow-up Information     Follow up With Specialties Details Why Contact Info Additional Information    [facility]  If symptoms worsen [address] [facility] [address], [address] [facility], Wilder Tenorio MD General Surgery Call As needed for more urgent follow up  [address]  [place] [address] [phone]           Discharge Medication List as of 12/8/2017  5:03 PM      CONTINUE these medications which have NOT CHANGED    Details   docusate sodium (COLACE) 100 mg capsule Take 1 capsule by mouth daily as needed for constipation (While taking narcotics), Starting Sun 12/3/2017, OTC      oxyCODONE-acetaminophen (PERCOCET) 5-325 mg per tablet Take 2 tablets by mouth every 4 (four) hours as needed for moderate pain for up to 10 days Max Daily Amount: 12 tablets, Starting Sun 12/3/2017, Until Wed 12/13/2017, Print           No discharge procedures on file      ED Provider  Electronically Signed by           Linda Ga PA-C  12/08/17 2729

## 2017-12-08 NOTE — DISCHARGE INSTRUCTIONS
Pain Management After Surgery   WHAT YOU NEED TO KNOW:   It is important to manage your pain after surgery so you can rest and heal  Pain management will also help you return to your normal activities  DISCHARGE INSTRUCTIONS:   Medicines:   · Acetaminophen  helps decrease pain  Follow directions  This medicine can cause liver damage if not taken correctly  · NSAIDs , such as ibuprofen, help decrease swelling, pain, and fever  This medicine is available with or without a doctor's order  NSAIDs can cause stomach bleeding or kidney problems in certain people  If you take blood thinner medicine, always ask your healthcare provider if NSAIDs are safe for you  Always read the medicine label and follow directions  · Prescription pain medicine  can be given as a pill, a patch, or a cream  Ask how to take this medicine safely  · Anxiety medicine  may help you feel less anxious and decrease your pain  · Take your medicine as directed  Call your healthcare provider if you think your medicine is not helping or if you have side effects  Tell him if you are allergic to any medicine  Keep a list of the medicines, vitamins, and herbs you take  Include the amounts, and when and why you take them  Bring the list or the pill bottles to follow-up visits  Carry your medicine list with you in case of an emergency  Follow up with your healthcare provider as directed:  Write down your questions so you remember to ask them during your visits  Manage your pain:   · Apply heat  on your surgery area for 20 to 30 minutes every 2 hours as directed  Heat helps decrease pain and muscle spasms  · Apply ice  on your surgery area for 15 to 20 minutes every hour or as directed  Use an ice pack, or put crushed ice in a plastic bag  Cover it with a towel  Ice helps prevent tissue damage and decreases swelling and pain  · Massage therapy  may help relax tight muscles and decrease pain       · Physical therapy  teaches you exercises to help improve movement and strength and decrease pain  · Self-hypnosis  is a way to direct your attention to something other than your pain  For example, you might repeat a positive statement about ignoring the pain or seeing the pain in a positive way  · Music  may help increase your energy level and improve your mood  It may help reduce pain by triggering your body to release endorphins  These are natural body chemicals that decrease pain  · Distraction  teaches you to focus your attention on something other than pain  For example, play cards, visit with family, or watch TV  · Transcutaneous electrical nerve stimulation (TENS)  is a portable device that is placed over the area of pain  It uses mild, safe electrical signals to help control pain  · Spinal cord stimulation (SCS)  uses mild, safe electrical signals to relax the nerves that cause your pain  An electrode is implanted near your spinal cord during a procedure  Contact your healthcare provider if:   · Your pain does not go away, or gets worse, even after you take medicine  · You feel too sleepy or groggy  · You have itching, a rash, or nausea from your medicine  · You have questions or concerns about your condition or care  Seek care immediately or call 911 if:   · You have severe pain  © 2016 [address] is for End User's use only and may not be sold, redistributed or otherwise used for commercial purposes  All illustrations and images included in CareNotes® are the copyrighted property of [employer] , [employer]  or [employer]  The above information is an  only  It is not intended as medical advice for individual conditions or treatments  Talk to your doctor, nurse or pharmacist before following any medical regimen to see if it is safe and effective for you

## 2017-12-26 ENCOUNTER — GENERIC CONVERSION - ENCOUNTER (OUTPATIENT)
Dept: OTHER | Facility: OTHER | Age: 27
End: 2017-12-26

## 2018-01-23 NOTE — MISCELLANEOUS
Message  Return to work or school:        Patient is cleared to return to work on 12/26/2017 the patient is not to lift anything heavy for 2 week from today  Esteban Valle        Signatures   Electronically signed by : Gabriela Lara, ; Dec 26 2017 12:06PM EST                       (Author)

## (undated) DEVICE — LIGHT HANDLE COVER SLEEVE DISP BLUE STELLAR

## (undated) DEVICE — SUT MONOCRYL 4-0 PS-2 18 IN Y496G

## (undated) DEVICE — INTENDED FOR TISSUE SEPARATION, AND OTHER PROCEDURES THAT REQUIRE A SHARP SURGICAL BLADE TO PUNCTURE OR CUT.: Brand: BARD-PARKER SAFETY BLADES SIZE 15, STERILE

## (undated) DEVICE — FLOSEAL MATRIX IS INDICATED IN SURGICAL PROCEDURES (OTHER THAN IN OPHTHALMIC) AS AN ADJUNCT TO HEMOSTASIS WHEN CONTROL OF BLEEDING BY LIGATURE OR CONVENTIONALPROCEDURES IS INEFFECTIVE OR IMPRACTICAL.: Brand: FLOSEAL HEMOSTATIC MATRIX

## (undated) DEVICE — AEM CORD

## (undated) DEVICE — GLOVE INDICATOR PI UNDERGLOVE SZ 7 BLUE

## (undated) DEVICE — ENDOPATH XCEL BLADELESS TROCARS WITH STABILITY SLEEVES: Brand: ENDOPATH XCEL

## (undated) DEVICE — SCD SEQUENTIAL COMPRESSION COMFORT SLEEVE MEDIUM KNEE LENGTH: Brand: KENDALL SCD

## (undated) DEVICE — GLOVE SRG BIOGEL 7

## (undated) DEVICE — FLOSEAL APPLICATOR TIP ENDOSCOPIC

## (undated) DEVICE — ALLENTOWN LAP CHOLE APP PACK: Brand: CARDINAL HEALTH

## (undated) DEVICE — REM POLYHESIVE ADULT PATIENT RETURN ELECTRODE: Brand: VALLEYLAB

## (undated) DEVICE — IRRIG ENDO FLO TUBING

## (undated) DEVICE — ADHESIVE SKN CLSR HISTOACRYL FLEX 0.5ML LF

## (undated) DEVICE — CHLORAPREP HI-LITE 26ML ORANGE

## (undated) DEVICE — ENDOPATH XCEL UNIVERSAL TROCAR STABLILITY SLEEVES: Brand: ENDOPATH XCEL

## (undated) DEVICE — [HIGH FLOW INSUFFLATOR,  DO NOT USE IF PACKAGE IS DAMAGED,  KEEP DRY,  KEEP AWAY FROM SUNLIGHT,  PROTECT FROM HEAT AND RADIOACTIVE SOURCES.]: Brand: PNEUMOSURE

## (undated) DEVICE — ENDOPOUCH RETRIEVER SPECIMEN RETRIEVAL BAGS: Brand: ENDOPOUCH RETRIEVER

## (undated) DEVICE — LIGAMAX 5 MM ENDOSCOPIC MULTIPLE CLIP APPLIER: Brand: LIGAMAX

## (undated) DEVICE — PMI DISPOSABLE PUNCTURE CLOSURE DEVICE / SUTURE GRASPER: Brand: PMI

## (undated) DEVICE — SUT VICRYL 0 REEL 54 IN J287G